# Patient Record
Sex: MALE | URBAN - METROPOLITAN AREA
[De-identification: names, ages, dates, MRNs, and addresses within clinical notes are randomized per-mention and may not be internally consistent; named-entity substitution may affect disease eponyms.]

---

## 2019-10-23 PROCEDURE — 82360 CALCULUS ASSAY QUANT: CPT

## 2019-10-24 ENCOUNTER — LAB REQUISITION (OUTPATIENT)
Dept: LAB | Facility: HOSPITAL | Age: 64
End: 2019-10-24

## 2019-10-24 DIAGNOSIS — N20.0 CALCULUS OF KIDNEY: ICD-10-CM

## 2019-10-31 LAB
COLOR STONE: NORMAL
COMPN STONE: NORMAL
CONV COMMENT: NORMAL
Lab: NORMAL
Lab: NORMAL
NIDUS STONE QL: NORMAL
PATH REPORT.COMMENTS IMP SPEC: NORMAL
SIZE STONE: NORMAL MM
URATE MFR STONE: 100 %
WT STONE: 68.4 MG

## 2023-04-24 ENCOUNTER — OFFICE VISIT (OUTPATIENT)
Dept: FAMILY MEDICINE CLINIC | Facility: CLINIC | Age: 68
End: 2023-04-24
Payer: COMMERCIAL

## 2023-04-24 ENCOUNTER — PATIENT ROUNDING (BHMG ONLY) (OUTPATIENT)
Dept: FAMILY MEDICINE CLINIC | Facility: CLINIC | Age: 68
End: 2023-04-24

## 2023-04-24 VITALS
DIASTOLIC BLOOD PRESSURE: 76 MMHG | TEMPERATURE: 98 F | WEIGHT: 282.2 LBS | HEIGHT: 74 IN | HEART RATE: 75 BPM | BODY MASS INDEX: 36.22 KG/M2 | RESPIRATION RATE: 16 BRPM | OXYGEN SATURATION: 98 % | SYSTOLIC BLOOD PRESSURE: 116 MMHG

## 2023-04-24 DIAGNOSIS — E11.65 UNCONTROLLED TYPE 2 DIABETES MELLITUS WITH HYPERGLYCEMIA: ICD-10-CM

## 2023-04-24 DIAGNOSIS — Z87.891 SMOKING HX: ICD-10-CM

## 2023-04-24 DIAGNOSIS — E55.9 VITAMIN D DEFICIENCY: ICD-10-CM

## 2023-04-24 DIAGNOSIS — I10 PRIMARY HYPERTENSION: Primary | ICD-10-CM

## 2023-04-24 DIAGNOSIS — M1A.9XX0 CHRONIC GOUT WITHOUT TOPHUS, UNSPECIFIED CAUSE, UNSPECIFIED SITE: ICD-10-CM

## 2023-04-24 DIAGNOSIS — Z11.59 NEED FOR HEPATITIS C SCREENING TEST: ICD-10-CM

## 2023-04-24 DIAGNOSIS — E78.2 MIXED HYPERLIPIDEMIA: ICD-10-CM

## 2023-04-24 DIAGNOSIS — Z12.2 SCREENING FOR LUNG CANCER: ICD-10-CM

## 2023-04-24 PROBLEM — M10.9 GOUT: Status: ACTIVE | Noted: 2023-04-24

## 2023-04-24 PROBLEM — M17.0 OSTEOARTHRITIS OF BOTH KNEES: Status: ACTIVE | Noted: 2022-02-10

## 2023-04-24 PROBLEM — H25.9 AGE-RELATED CATARACT OF BOTH EYES: Status: ACTIVE | Noted: 2017-12-27

## 2023-04-24 PROBLEM — I35.0 AORTIC STENOSIS, MILD: Status: ACTIVE | Noted: 2021-03-08

## 2023-04-24 PROBLEM — N18.30 STAGE 3 CHRONIC KIDNEY DISEASE: Status: ACTIVE | Noted: 2021-11-18

## 2023-04-24 PROBLEM — G47.33 OBSTRUCTIVE SLEEP APNEA: Status: ACTIVE | Noted: 2023-04-24

## 2023-04-24 PROBLEM — M24.021 LOOSE BODY IN ELBOW JOINT, RIGHT: Status: ACTIVE | Noted: 2020-07-06

## 2023-04-24 PROBLEM — R09.89 LEFT CAROTID BRUIT: Status: ACTIVE | Noted: 2021-03-08

## 2023-04-24 PROBLEM — M19.90 ARTHRITIS: Status: ACTIVE | Noted: 2020-07-08

## 2023-04-24 PROBLEM — E11.22 TYPE 2 DIABETES MELLITUS WITH DIABETIC CHRONIC KIDNEY DISEASE: Status: ACTIVE | Noted: 2021-11-18

## 2023-04-24 PROBLEM — E87.5 HYPERKALEMIA: Status: ACTIVE | Noted: 2021-11-18

## 2023-04-24 PROCEDURE — 99203 OFFICE O/P NEW LOW 30 MIN: CPT | Performed by: NURSE PRACTITIONER

## 2023-04-24 RX ORDER — LANOLIN ALCOHOL/MO/W.PET/CERES
1 CREAM (GRAM) TOPICAL
COMMUNITY

## 2023-04-24 RX ORDER — DAPAGLIFLOZIN 10 MG/1
1 TABLET, FILM COATED ORAL
COMMUNITY
End: 2023-04-24 | Stop reason: SDUPTHER

## 2023-04-24 RX ORDER — AMPICILLIN TRIHYDRATE 250 MG
2 CAPSULE ORAL
COMMUNITY

## 2023-04-24 RX ORDER — EPLERENONE 50 MG/1
1 TABLET, FILM COATED ORAL DAILY
COMMUNITY
Start: 2023-02-13

## 2023-04-24 RX ORDER — ROSUVASTATIN CALCIUM 20 MG/1
1 TABLET, COATED ORAL DAILY
COMMUNITY
Start: 2023-03-14

## 2023-04-24 RX ORDER — BLOOD SUGAR DIAGNOSTIC
STRIP MISCELLANEOUS
COMMUNITY
Start: 2023-02-21

## 2023-04-24 RX ORDER — HYDROCODONE BITARTRATE AND ACETAMINOPHEN 7.5; 325 MG/1; MG/1
1 TABLET ORAL
COMMUNITY

## 2023-04-24 RX ORDER — TERAZOSIN 5 MG/1
5 CAPSULE ORAL NIGHTLY
COMMUNITY
Start: 2023-03-17

## 2023-04-24 RX ORDER — OLMESARTAN MEDOXOMIL 40 MG/1
1 TABLET ORAL DAILY
COMMUNITY
Start: 2023-03-17

## 2023-04-24 RX ORDER — LANCETS 30 GAUGE
EACH MISCELLANEOUS
COMMUNITY
Start: 2022-12-31

## 2023-04-24 RX ORDER — METFORMIN HYDROCHLORIDE 500 MG/1
2 TABLET, EXTENDED RELEASE ORAL 2 TIMES DAILY
COMMUNITY
Start: 2023-03-14 | End: 2023-04-24 | Stop reason: SDUPTHER

## 2023-04-24 RX ORDER — ASPIRIN 81 MG/1
81 TABLET ORAL
COMMUNITY

## 2023-04-24 RX ORDER — PREDNISONE 20 MG/1
TABLET ORAL
COMMUNITY
Start: 2023-03-13 | End: 2023-04-24

## 2023-04-24 RX ORDER — METHYLPREDNISOLONE 4 MG/1
TABLET ORAL
COMMUNITY
Start: 2023-04-19

## 2023-04-24 RX ORDER — ICOSAPENT ETHYL 1000 MG/1
CAPSULE ORAL
COMMUNITY
Start: 2023-02-13

## 2023-04-24 RX ORDER — MELATONIN
1
COMMUNITY

## 2023-04-24 RX ORDER — ALLOPURINOL 100 MG/1
1 TABLET ORAL
COMMUNITY
Start: 2023-03-14

## 2023-04-24 RX ORDER — DIPHENOXYLATE HYDROCHLORIDE AND ATROPINE SULFATE 2.5; .025 MG/1; MG/1
1 TABLET ORAL DAILY
COMMUNITY

## 2023-04-24 RX ORDER — CLONIDINE HYDROCHLORIDE 0.1 MG/1
0.2 TABLET ORAL 3 TIMES DAILY
COMMUNITY
Start: 2023-02-15

## 2023-04-24 RX ORDER — METOPROLOL SUCCINATE 100 MG/1
TABLET, EXTENDED RELEASE ORAL
COMMUNITY
Start: 2023-04-03

## 2023-04-24 RX ORDER — UBIDECARENONE 200 MG
1 CAPSULE ORAL DAILY
COMMUNITY

## 2023-04-24 NOTE — PROGRESS NOTES
April 24, 2023      Tyson Trimble,     I want to officially welcome you to our practice and ask about your recent visit.     Overall were you satisfied with your visit?   YES    Would you recommend our office to friends and family?   YES    Your experience is very important to us.  You may receive an email regarding a survey.  Please take a moment to complete.  This will help us to improve.      I appreciate you taking the time to answer these questions.       Thank you and have a great day.

## 2023-04-24 NOTE — PROGRESS NOTES
Subjective     Amrik Trimble is a 67 y.o.. male.     Patient here today to become established.    Patient with problem list that includes hypertension, hyperlipidemia, diabetes, CKD, OA, and sleep apnea. Patient sees cardiologist Dr. Lu Hernandez at [facility]; Endocrinologist Dr. Kenya Eric at [facility]; nephrologist Dr. Owen/Venessa at [facility]; Dr. Sukhwinder العلي at  [facility] [place]; ENT and Allergist which he does not remember names. Patient stating he has been having issues with allergies and was put on a 20 day steroid from ENT about 2 months ago and now is on a steroid pack by his Opthamologist.     Patient stating he eats somewhat healthy, drinks water through out day, does drink Ice tea occasionally and soft drinks occasionally. Patient stating he uses a CPAP at night.     Patient having labs done on 4/11/23 with results of glucose 249, HgA1c 9.3, Bun/Creat 23/1.06, Na 142, K+ 5.0, calcium 10.1, AST/ALT 26/33, uric acid 5.6, total cholesterol 153, triglycerides 336, HDL 52, LDL 50, VLDL 51 and UA was normal except with glucose noted.       The following portions of the patient's history were reviewed and updated as appropriate: allergies, current medications, past family history, past medical history, past social history, past surgical history and problem list.    Past Medical History:   Diagnosis Date   • Age-related cataract of both eyes 12/27/2017   • Aortic stenosis, mild 3/8/2021   • Arthritis 7/8/2020    Formatting of this note might be different from the original. Added automatically from request for surgery [id number]   • CAD (coronary artery disease) 5/10/2013   • Gout 4/24/2023   • HTN (hypertension) 5/10/2013   • Hyperkalemia 11/18/2021   • Hyperlipidemia 5/10/2013   • Left carotid bruit 3/8/2021   • Non-ST elevation MI (NSTEMI) 2/1/2012   • Obesity 7/30/2014   • Obstructive sleep apnea 4/24/2023    Formatting of this note might be different from the  "original. compliant   • Osteoarthritis of both knees 2/10/2022   • Proteinuria 2/15/2016   • S/P PTCA (percutaneous transluminal coronary angioplasty) 7/30/2014   • Smoking hx 5/10/2013   • Stage 3 chronic kidney disease 11/18/2021   • Tear of medial meniscus of knee 12/22/2015    Formatting of this note might be different from the original. Added automatically from request for surgery [id number]   • Type 2 diabetes mellitus with diabetic chronic kidney disease 11/18/2021   • Uncontrolled type 2 diabetes mellitus with hyperglycemia 1/2/2015   • Vitamin D deficiency 3/17/2014       Past Surgical History:   Procedure Laterality Date   • CORONARY ANGIOPLASTY      with stents   • ELBOW ARTHROPLASTY Right    • HAND SURGERY Left    • INGUINAL HERNIA REPAIR Bilateral    • KIDNEY STONE SURGERY     • MENISCECTOMY Bilateral    • ROTATOR CUFF REPAIR Left    • ROTATOR CUFF REPAIR Right     x 2   • THUMB ARTHROSCOPY Left     re-attachement       Review of Systems   Constitutional: Negative.    HENT: Negative.    Respiratory: Negative.    Cardiovascular: Negative.    Gastrointestinal: Negative.    Genitourinary: Negative.    Neurological: Negative.    Psychiatric/Behavioral: Negative.        No Known Allergies    Objective     Vitals:    04/24/23 0930   BP: 116/76   Pulse: 75   Resp: 16   Temp: 98 °F (36.7 °C)   SpO2: 98%   Weight: 128 kg (282 lb 3.2 oz)   Height: 188 cm (74\")     Body mass index is 36.23 kg/m².    Physical Exam  Vitals reviewed.   HENT:      Head: Normocephalic.   Eyes:      Pupils: Pupils are equal, round, and reactive to light.   Neck:      Vascular: No carotid bruit.   Cardiovascular:      Rate and Rhythm: Normal rate and regular rhythm.      Pulses: Normal pulses.      Heart sounds: Murmur heard.   Pulmonary:      Effort: Pulmonary effort is normal.      Breath sounds: Normal breath sounds.   Musculoskeletal:         General: Normal range of motion.   Skin:     General: Skin is warm and dry.   Neurological:      " Mental Status: He is alert and oriented to person, place, and time.   Psychiatric:         Behavior: Behavior normal.           Current Outpatient Medications:   •  Accu-Chek Guide test strip, , Disp: , Rfl:   •  allopurinol (ZYLOPRIM) 100 MG tablet, Take 1 tablet by mouth., Disp: , Rfl:   •  aspirin 81 MG EC tablet, Take 1 tablet by mouth., Disp: , Rfl:   •  cholecalciferol (VITAMIN D3) 25 MCG (1000 UT) tablet, Take 1 tablet by mouth., Disp: , Rfl:   •  Cinnamon 500 MG capsule, Take 2 capsules by mouth., Disp: , Rfl:   •  cloNIDine (CATAPRES) 0.1 MG tablet, Take 2 tablets by mouth 3 (Three) Times a Day., Disp: , Rfl:   •  Coenzyme Q10 200 MG capsule, Take 1 tablet by mouth Daily., Disp: , Rfl:   •  dapagliflozin Propanediol 10 MG tablet, Take 10 mg by mouth Daily., Disp: 90 tablet, Rfl: 1  •  Easy Touch Lancets 30G/Twist misc, , Disp: , Rfl:   •  eplerenone (INSPRA) 50 MG tablet, Take 1 tablet by mouth Daily., Disp: , Rfl:   •  glimepiride (AMARYL) 4 MG tablet, Take 1 tablet by mouth 2 (Two) Times a Day., Disp: 180 tablet, Rfl: 1  •  icosapent ethyl (VASCEPA) 1 g capsule capsule, TAKE 2 CAPSULES BY MOUTH TWO TIMES A DAY WITH MEALS, Disp: , Rfl:   •  metFORMIN ER (GLUCOPHAGE-XR) 500 MG 24 hr tablet, Take 2 tablets by mouth 2 (Two) Times a Day., Disp: 360 tablet, Rfl: 1  •  methylPREDNISolone (Medrol) 4 MG dose pack, Take  by mouth., Disp: , Rfl:   •  metoprolol succinate XL (TOPROL-XL) 100 MG 24 hr tablet, , Disp: , Rfl:   •  multivitamin (THERAGRAN) tablet tablet, Take 1 tablet by mouth Daily., Disp: , Rfl:   •  olmesartan (BENICAR) 40 MG tablet, Take 1 tablet by mouth Daily., Disp: , Rfl:   •  rosuvastatin (CRESTOR) 20 MG tablet, Take 1 tablet by mouth Daily., Disp: , Rfl:   •  terazosin (HYTRIN) 5 MG capsule, Take 1 capsule by mouth Every Night., Disp: , Rfl:   •  Cholecalciferol 50 MCG (2000 UT) capsule, Take 1 capsule by mouth Daily., Disp: , Rfl:   •  HYDROcodone-acetaminophen (NORCO) 7.5-325 MG per tablet,  Take 1 tablet by mouth., Disp: , Rfl:   •  thiamine (VITAMIN B1) 100 MG tablet, Take 1 tablet by mouth., Disp: , Rfl:       Diagnoses and all orders for this visit:    1. Primary hypertension (Primary)  -     CBC & Differential    2. Mixed hyperlipidemia    3. Uncontrolled type 2 diabetes mellitus with hyperglycemia    4. Vitamin D deficiency    5. Chronic gout without tophus, unspecified cause, unspecified site    6. Smoking hx  -     CT Chest Low Dose Wo; Future    7. Screening for lung cancer  -     CT Chest Low Dose Wo; Future    8. Need for hepatitis C screening test  -     Hepatitis C antibody        Patient Instructions   Hypertension: stable, continue clonidine 0.1 mg two tabs three times a day, inspra 50 mg 1 tab daily, metoprolol  mg 1 tab daily, olmesartan 40 mg 1 tab daily    Hyperlipidemia: stable, triglycerides elevated; recommend working on eating less sugary foods, white bread, white rice, white potatoes, starchy foods; continue Vascepa 1 g two caps twice a day, rosuvastatin 20 mg 1 tab daily    DM2: uncontrolled; discussed Patient being on back to back steroids and elevation of his HgA1c, recommend he call and follow up with his endocrinologist; continue dapagliflozin 10 mg 1 tab daily, amaryl 4 mg 1 tab twice a day, metformin  mg 2 tabs twice a day    Vitamin D deficiency: stable as of 11/28/22 labs with result of 35.8, continue vitamin D supplement daily    Chronic gout: stable, continue allopurinol 100 mg 1 tab daily          Return for Medicare Wellness.

## 2023-04-24 NOTE — PATIENT INSTRUCTIONS
Hypertension: stable, continue clonidine 0.1 mg two tabs three times a day, inspra 50 mg 1 tab daily, metoprolol  mg 1 tab daily, olmesartan 40 mg 1 tab daily    Hyperlipidemia: stable, triglycerides elevated; recommend working on eating less sugary foods, white bread, white rice, white potatoes, starchy foods; continue Vascepa 1 g two caps twice a day, rosuvastatin 20 mg 1 tab daily    DM2: uncontrolled; discussed Patient being on back to back steroids and elevation of his HgA1c, recommend he call and follow up with his endocrinologist; continue dapagliflozin 10 mg 1 tab daily, amaryl 4 mg 1 tab twice a day, metformin  mg 2 tabs twice a day    Vitamin D deficiency: stable as of 11/28/22 labs with result of 35.8, continue vitamin D supplement daily    Chronic gout: stable, continue allopurinol 100 mg 1 tab daily

## 2023-04-25 ENCOUNTER — TELEPHONE (OUTPATIENT)
Dept: FAMILY MEDICINE CLINIC | Facility: CLINIC | Age: 68
End: 2023-04-25
Payer: COMMERCIAL

## 2023-04-25 LAB
BASOPHILS # BLD AUTO: 0.05 10*3/MM3 (ref 0–0.2)
BASOPHILS NFR BLD AUTO: 0.6 % (ref 0–1.5)
EOSINOPHIL # BLD AUTO: 0.03 10*3/MM3 (ref 0–0.4)
EOSINOPHIL NFR BLD AUTO: 0.3 % (ref 0.3–6.2)
ERYTHROCYTE [DISTWIDTH] IN BLOOD BY AUTOMATED COUNT: 12 % (ref 12.3–15.4)
HCT VFR BLD AUTO: 45.5 % (ref 37.5–51)
HCV IGG SERPL QL IA: NON REACTIVE
HGB BLD-MCNC: 15.5 G/DL (ref 13–17.7)
IMM GRANULOCYTES # BLD AUTO: 0.05 10*3/MM3 (ref 0–0.05)
IMM GRANULOCYTES NFR BLD AUTO: 0.6 % (ref 0–0.5)
LYMPHOCYTES # BLD AUTO: 2.34 10*3/MM3 (ref 0.7–3.1)
LYMPHOCYTES NFR BLD AUTO: 26.4 % (ref 19.6–45.3)
MCH RBC QN AUTO: 30.6 PG (ref 26.6–33)
MCHC RBC AUTO-ENTMCNC: 34.1 G/DL (ref 31.5–35.7)
MCV RBC AUTO: 89.9 FL (ref 79–97)
MONOCYTES # BLD AUTO: 0.7 10*3/MM3 (ref 0.1–0.9)
MONOCYTES NFR BLD AUTO: 7.9 % (ref 5–12)
NEUTROPHILS # BLD AUTO: 5.7 10*3/MM3 (ref 1.7–7)
NEUTROPHILS NFR BLD AUTO: 64.2 % (ref 42.7–76)
NRBC BLD AUTO-RTO: 0 /100 WBC (ref 0–0.2)
PLATELET # BLD AUTO: 233 10*3/MM3 (ref 140–450)
RBC # BLD AUTO: 5.06 10*6/MM3 (ref 4.14–5.8)
WBC # BLD AUTO: 8.87 10*3/MM3 (ref 3.4–10.8)

## 2023-04-25 NOTE — TELEPHONE ENCOUNTER
----- Message from KAI Brown sent at 4/25/2023  2:37 PM EDT -----  Please call Patient and inform him that his cbc  WBC was normal, no systemic infection  Hemoglobin and Hematocrit were normal, no anemia issues  Plantlet count was normal (this is what clots your blood), no issues  Hep C negative  Thanks

## 2023-05-25 ENCOUNTER — OFFICE VISIT (OUTPATIENT)
Dept: FAMILY MEDICINE CLINIC | Facility: CLINIC | Age: 68
End: 2023-05-25
Payer: COMMERCIAL

## 2023-05-25 VITALS
WEIGHT: 284 LBS | DIASTOLIC BLOOD PRESSURE: 58 MMHG | OXYGEN SATURATION: 95 % | RESPIRATION RATE: 16 BRPM | BODY MASS INDEX: 36.45 KG/M2 | HEART RATE: 65 BPM | TEMPERATURE: 97.8 F | SYSTOLIC BLOOD PRESSURE: 108 MMHG | HEIGHT: 74 IN

## 2023-05-25 DIAGNOSIS — I25.10 CORONARY ARTERY DISEASE, UNSPECIFIED VESSEL OR LESION TYPE, UNSPECIFIED WHETHER ANGINA PRESENT, UNSPECIFIED WHETHER NATIVE OR TRANSPLANTED HEART: ICD-10-CM

## 2023-05-25 DIAGNOSIS — I10 PRIMARY HYPERTENSION: ICD-10-CM

## 2023-05-25 DIAGNOSIS — Z00.00 MEDICARE ANNUAL WELLNESS VISIT, INITIAL: Primary | ICD-10-CM

## 2023-05-25 DIAGNOSIS — L98.9 SKIN ABNORMALITIES: ICD-10-CM

## 2023-05-25 DIAGNOSIS — I35.0 AORTIC STENOSIS, MILD: ICD-10-CM

## 2023-05-25 DIAGNOSIS — Z00.00 INITIAL MEDICARE ANNUAL WELLNESS VISIT: Primary | ICD-10-CM

## 2023-05-25 DIAGNOSIS — M25.551 PAIN OF RIGHT HIP: ICD-10-CM

## 2023-05-25 LAB
BILIRUB BLD-MCNC: NEGATIVE MG/DL
CLARITY, POC: ABNORMAL
COLOR UR: YELLOW
GLUCOSE UR STRIP-MCNC: ABNORMAL MG/DL
KETONES UR QL: NEGATIVE
LEUKOCYTE EST, POC: NEGATIVE
NITRITE UR-MCNC: NEGATIVE MG/ML
PH UR: 5 [PH] (ref 5–8)
PROT UR STRIP-MCNC: NEGATIVE MG/DL
RBC # UR STRIP: NEGATIVE /UL
SP GR UR: 1.02 (ref 1–1.03)
UROBILINOGEN UR QL: NORMAL

## 2023-05-25 NOTE — PROGRESS NOTES
The ABCs of the Annual Wellness Visit  Initial Medicare Wellness Visit    Subjective     Amrik Trimble is a 67 y.o. male who presents for an Initial Medicare Wellness Visit.    The following portions of the patient's history were reviewed and   updated as appropriate: allergies, current medications, past family history, past medical history, past social history, past surgical history and problem list.     Compared to one year ago, the patient feels his physical   health is worse. Right hip pain    Compared to one year ago, the patient feels his mental   health is the same.    Recent Hospitalizations:  He was not admitted to the hospital during the last year.       Current Medical Providers:  Patient Care Team:  Rupinder Link APRN as PCP - General (Family Medicine)    Outpatient Medications Prior to Visit   Medication Sig Dispense Refill   • Accu-Chek Guide test strip      • ACETAMINOPHEN PO Take 1 tablet by mouth.     • allopurinol (ZYLOPRIM) 100 MG tablet Take 1 tablet by mouth.     • aspirin 81 MG EC tablet Take 1 tablet by mouth.     • cholecalciferol (VITAMIN D3) 25 MCG (1000 UT) tablet Take 1 tablet by mouth.     • Cholecalciferol 50 MCG (2000 UT) capsule Take 1 capsule by mouth Daily.     • Cinnamon 500 MG capsule Take 2 capsules by mouth.     • cloNIDine (CATAPRES) 0.1 MG tablet Take 2 tablets by mouth 3 (Three) Times a Day.     • Coenzyme Q10 200 MG capsule Take 1 tablet by mouth Daily.     • dapagliflozin Propanediol 10 MG tablet Take 10 mg by mouth Daily. 90 tablet 1   • Easy Touch Lancets 30G/Twist misc      • eplerenone (INSPRA) 50 MG tablet Take 1 tablet by mouth Daily.     • fluconazole (DIFLUCAN) 200 MG tablet Take 2 tablets by mouth on day 1 and then take 1 tablet daily for two weeks 15 tablet 0   • glimepiride (AMARYL) 4 MG tablet Take 1 tablet by mouth 2 (Two) Times a Day. 180 tablet 1   • HYDROcodone-acetaminophen (NORCO) 7.5-325 MG per tablet Take 1 tablet by mouth.     • hydrocortisone 2.5 %  ointment Apply a thin layer to affected areas by topical route twice daily. Safe to use on face. 454 g 11   • icosapent ethyl (VASCEPA) 1 g capsule capsule TAKE 2 CAPSULES BY MOUTH TWO TIMES A DAY WITH MEALS     • ketotifen ([employer] Allergy Eye Drops) 0.025 % ophthalmic solution Administer 1 drop to both eyes 2 (Two) Times a Day As Needed for itch 10 mL 12   • metFORMIN ER (GLUCOPHAGE-XR) 500 MG 24 hr tablet Take 2 tablets by mouth 2 (Two) Times a Day. 360 tablet 1   • methylPREDNISolone (MEDROL) 4 MG dose pack Take  by mouth.     • metoprolol succinate XL (TOPROL-XL) 100 MG 24 hr tablet      • multivitamin (THERAGRAN) tablet tablet Take 1 tablet by mouth Daily.     • olmesartan (BENICAR) 40 MG tablet Take 1 tablet by mouth Daily.     • rosuvastatin (CRESTOR) 20 MG tablet Take 1 tablet by mouth Daily.     • terazosin (HYTRIN) 5 MG capsule Take 1 capsule by mouth Every Night.     • thiamine (VITAMIN B1) 100 MG tablet Take 1 tablet by mouth.       No facility-administered medications prior to visit.       Opioid medication/s are on active medication list.  and I have evaluated his active treatment plan and pain score trends (see table).  There were no vitals filed for this visit.  I have reviewed the chart for potential of high risk medication and harmful drug interactions in the elderly.            Aspirin is on active medication list. Aspirin use is indicated based on review of current medical condition/s. Pros and cons of this therapy have been discussed today. Benefits of this medication outweigh potential harm.  Patient has been encouraged to continue taking this medication.  .      Patient Active Problem List   Diagnosis   • Age-related cataract of both eyes   • Aortic stenosis, mild   • Arthritis   • CAD (coronary artery disease)   • HTN (hypertension)   • Hyperkalemia   • Hyperlipidemia   • Knee pain   • Left carotid bruit   • Loose body in elbow joint, right   • Non-ST elevation MI (NSTEMI)   • Obesity   •  "Obstructive sleep apnea   • Osteoarthritis of both knees   • Proteinuria   • S/P PTCA (percutaneous transluminal coronary angioplasty)   • Smoking hx   • Stage 3 chronic kidney disease   • Tear of medial meniscus of knee   • Type 2 diabetes mellitus with diabetic chronic kidney disease   • Uncontrolled type 2 diabetes mellitus with hyperglycemia   • Vitamin D deficiency   • Gout     Advance Care Planning   Advance Care Planning     Advance Directive is not on file.  ACP discussion was held with the patient during this visit. Patient does not have an advance directive, declines further assistance.       Objective    Vitals:    05/25/23 0808   BP: 108/58   Pulse: 65   Resp: 16   Temp: 97.8 °F (36.6 °C)   SpO2: 95%   Weight: 129 kg (284 lb)   Height: 188 cm (74.02\")     Estimated body mass index is 36.45 kg/m² as calculated from the following:    Height as of this encounter: 188 cm (74.02\").    Weight as of this encounter: 129 kg (284 lb).    Class 2 Severe Obesity (BMI >=35 and <=39.9). Obesity-related health conditions include the following: obstructive sleep apnea, hypertension, coronary heart disease, diabetes mellitus, dyslipidemias and osteoarthritis. Obesity is unchanged. BMI is is above average; BMI management plan is completed. We discussed portion control and increasing exercise.      Does the patient have evidence of cognitive impairment?   No          HEALTH RISK ASSESSMENT    Smoking Status:  Social History     Tobacco Use   Smoking Status Former   • Types: Cigarettes   Smokeless Tobacco Never   Tobacco Comments    Quit in 2012; 1+PPD     Alcohol Consumption:  Social History     Substance and Sexual Activity   Alcohol Use Yes    Comment: rarely     Fall Risk Screen:    OMEGA Fall Risk Assessment was completed, and patient is at LOW risk for falls.Assessment completed on:5/25/2023    Depression Screen:       5/25/2023     8:05 AM   PHQ-2/PHQ-9 Depression Screening   Little Interest or Pleasure in Doing " Things 0-->not at all   Feeling Down, Depressed or Hopeless 0-->not at all   PHQ-9: Brief Depression Severity Measure Score 0       Health Habits and Functional and Cognitive Screenin/25/2023     8:06 AM   Functional & Cognitive Status   Do you have difficulty preparing food and eating? No   Do you have difficulty bathing yourself, getting dressed or grooming yourself? No   Do you have difficulty using the toilet? No   Do you have difficulty moving around from place to place? No   Do you have trouble with steps or getting out of a bed or a chair? No   Current Diet Well Balanced Diet   Dental Exam Up to date   Eye Exam Up to date   Exercise (times per week) 1 times per week   Current Exercises Include Walking;Yard Work   Do you need help using the phone?  No   Are you deaf or do you have serious difficulty hearing?  No   Do you need help with transportation? No   Do you need help shopping? No   Do you need help preparing meals?  No   Do you need help with housework?  No   Do you need help with laundry? No   Do you need help taking your medications? No   Do you need help managing money? No   Do you ever drive or ride in a car without wearing a seat belt? No   Have you felt unusual stress, anger or loneliness in the last month? No   Who do you live with? Spouse   If you need help, do you have trouble finding someone available to you? No   Have you been bothered in the last four weeks by sexual problems? No   Do you have difficulty concentrating, remembering or making decisions? No       Age-appropriate Screening Schedule:  Refer to the list below for future screening recommendations based on patient's age, sex and/or medical conditions. Orders for these recommended tests are listed in the plan section. The patient has been provided with a written plan.    Health Maintenance   Topic Date Due   • ZOSTER VACCINE (2 of 3) 11/15/2017   • COVID-19 Vaccine (4 - Booster for Moderna series) 2022   • AAA SCREEN  (ONE-TIME)  Never done   • INFLUENZA VACCINE  08/01/2023   • HEMOGLOBIN A1C  10/11/2023   • URINE MICROALBUMIN  11/28/2023   • DIABETIC FOOT EXAM  02/10/2024   • LIPID PANEL  04/11/2024   • DIABETIC EYE EXAM  04/19/2024   • ANNUAL WELLNESS VISIT  05/25/2024   • COLORECTAL CANCER SCREENING  04/09/2025   • Pneumococcal Vaccine 65+ (3 - PPSV23 if available, else PCV20) 01/28/2027   • TDAP/TD VACCINES (2 - Td or Tdap) 09/20/2029   • HEPATITIS C SCREENING  Completed          Recent Results (from the past 1008 hour(s))   CBC & Differential    Collection Time: 04/24/23 10:31 AM    Specimen: Blood   Result Value Ref Range    WBC 8.87 3.40 - 10.80 10*3/mm3    RBC 5.06 4.14 - 5.80 10*6/mm3    Hemoglobin 15.5 13.0 - 17.7 g/dL    Hematocrit 45.5 37.5 - 51.0 %    MCV 89.9 79.0 - 97.0 fL    MCH 30.6 26.6 - 33.0 pg    MCHC 34.1 31.5 - 35.7 g/dL    RDW 12.0 (L) 12.3 - 15.4 %    Platelets 233 140 - 450 10*3/mm3    Neutrophil Rel % 64.2 42.7 - 76.0 %    Lymphocyte Rel % 26.4 19.6 - 45.3 %    Monocyte Rel % 7.9 5.0 - 12.0 %    Eosinophil Rel % 0.3 0.3 - 6.2 %    Basophil Rel % 0.6 0.0 - 1.5 %    Neutrophils Absolute 5.70 1.70 - 7.00 10*3/mm3    Lymphocytes Absolute 2.34 0.70 - 3.10 10*3/mm3    Monocytes Absolute 0.70 0.10 - 0.90 10*3/mm3    Eosinophils Absolute 0.03 0.00 - 0.40 10*3/mm3    Basophils Absolute 0.05 0.00 - 0.20 10*3/mm3    Immature Granulocyte Rel % 0.6 (H) 0.0 - 0.5 %    Immature Grans Absolute 0.05 0.00 - 0.05 10*3/mm3    nRBC 0.0 0.0 - 0.2 /100 WBC   Hepatitis C antibody    Collection Time: 04/24/23 10:31 AM    Specimen: Blood   Result Value Ref Range    Hep C Virus Ab Non Reactive Non Reactive   POCT urinalysis dipstick, manual    Collection Time: 05/25/23  8:41 AM    Specimen: Urine   Result Value Ref Range    Color Yellow Yellow, Straw, Dark Yellow, Heena    Clarity, UA Cloudy (A) Clear    Glucose, UA 1000 mg/dL (A) Negative mg/dL    Bilirubin Negative Negative    Ketones, UA Negative Negative    Specific Gravity   1.025 1.005 - 1.030    Blood, UA Negative Negative    pH, Urine 5.0 5.0 - 8.0    Protein, POC Negative Negative mg/dL    Urobilinogen, UA Normal Normal, 0.2 E.U./dL    Leukocytes Negative Negative    Nitrite, UA Negative Negative       Physical Exam  Vitals reviewed.   Constitutional:       Appearance: Normal appearance. He is well-developed.   HENT:      Head: Normocephalic and atraumatic.      Right Ear: Tympanic membrane normal. Tympanic membrane is not erythematous.      Left Ear: Tympanic membrane normal. Tympanic membrane is not erythematous.      Nose: Nose normal.   Eyes:      Conjunctiva/sclera: Conjunctivae normal.      Pupils: Pupils are equal, round, and reactive to light.   Neck:      Thyroid: No thyromegaly.      Vascular: No carotid bruit.      Trachea: No tracheal deviation.   Cardiovascular:      Rate and Rhythm: Normal rate and regular rhythm.      Pulses: Normal pulses.      Heart sounds: Normal heart sounds. No murmur heard.  Pulmonary:      Effort: Pulmonary effort is normal. No accessory muscle usage or respiratory distress.      Breath sounds: Normal breath sounds. No stridor. No decreased breath sounds, wheezing, rhonchi or rales.   Abdominal:      General: Bowel sounds are normal. There is no distension.      Palpations: Abdomen is soft. Abdomen is not rigid. There is no mass.      Tenderness: There is no abdominal tenderness. There is no guarding or rebound.      Hernia: No hernia is present.   Musculoskeletal:         General: Normal range of motion.      Cervical back: Normal range of motion and neck supple.   Lymphadenopathy:      Cervical: No cervical adenopathy.   Skin:     General: Skin is warm and dry.   Neurological:      Mental Status: He is alert and oriented to person, place, and time.      Cranial Nerves: No cranial nerve deficit.      Sensory: No sensory deficit.      Motor: No abnormal muscle tone.      Coordination: Coordination normal.   Psychiatric:         Speech: Speech  normal.         Behavior: Behavior normal.       EKG in office today: SR, vent rate 78, ID int [id number],     CMS Preventative Services Quick Reference  Risk Factors Identified During Encounter    Chronic Pain: Orthopedics Referral Ordered  Immunizations Discussed/Encouraged: Shingrix and COVID19  Inactivity/Sedentary: Patient was advised to exercise at least 150 minutes a week per CDC recommendations.  Dental Screening Recommended  Vision Screening Recommended    The above risks/problems have been discussed with the patient.  Pertinent information has been shared with the patient in the After Visit Summary.  An After Visit Summary and PPPS were made available to the patient.  Diagnoses and all orders for this visit:    1. Medicare annual wellness visit, initial (Primary)  -     POCT urinalysis dipstick, manual    2. Primary hypertension  -     Ambulatory Referral to Cardiology    3. Aortic stenosis, mild  -     Ambulatory Referral to Cardiology    4. Coronary artery disease, unspecified vessel or lesion type, unspecified whether angina present, unspecified whether native or transplanted heart  -     Ambulatory Referral to Cardiology    5. Pain of right hip  -     Ambulatory Referral to Orthopedic Surgery    6. Skin abnormalities  -     Ambulatory Referral to Dermatology      Follow Up:  Next Medicare Wellness visit to be scheduled in 1 year.        cardiologist Dr. Lu Hernandez at [facility]; wanting to switch to [facility], referral placed  Endocrinologist Dr. Kenya Eric at [facility]  nephrologist Dr. Owen/Venessa at [facility]  Dr. Sukhwinder العلي at  [facility]      Low dose CT chest ordered 4/24/23  Cologaurd 4/9/2022 negative  PSA 7/22/22 0.83

## 2023-06-15 ENCOUNTER — HOSPITAL ENCOUNTER (EMERGENCY)
Facility: HOSPITAL | Age: 68
Discharge: HOME | End: 2023-06-15
Attending: EMERGENCY MEDICINE
Payer: COMMERCIAL

## 2023-06-15 VITALS
TEMPERATURE: 97.6 F | HEIGHT: 74 IN | SYSTOLIC BLOOD PRESSURE: 120 MMHG | BODY MASS INDEX: 36.32 KG/M2 | RESPIRATION RATE: 17 BRPM | WEIGHT: 283 LBS | DIASTOLIC BLOOD PRESSURE: 67 MMHG | HEART RATE: 102 BPM | OXYGEN SATURATION: 93 %

## 2023-06-15 DIAGNOSIS — S61.412A LACERATION OF LEFT HAND, FOREIGN BODY PRESENCE UNSPECIFIED, INITIAL ENCOUNTER: Primary | ICD-10-CM

## 2023-06-15 RX ORDER — LIDOCAINE HYDROCHLORIDE AND EPINEPHRINE 10; 10 MG/ML; UG/ML
10 INJECTION, SOLUTION INFILTRATION; PERINEURAL ONCE
Status: COMPLETED | OUTPATIENT
Start: 2023-06-15 | End: 2023-06-15

## 2023-06-15 RX ADMIN — LIDOCAINE HYDROCHLORIDE,EPINEPHRINE BITARTRATE 10 ML: 10; .01 INJECTION, SOLUTION INFILTRATION; PERINEURAL at 18:54

## 2023-06-15 NOTE — ED PROVIDER NOTES
EMERGENCY DEPARTMENT ENCOUNTER    Room Number:  S01/01  Date of encounter:  6/15/2023  PCP: Rupinder Link APRN  Historian: Patient  Full history not obtainable due to: None    HPI:  Chief Complaint: Laceration    Context: Amrik Trimble is a 67 y.o. male who presents to the ED c/o of superficial laceration involving the dorsum of the left hand which he sustained earlier today.  The patient was cleaning on top of a cabinet and cut himself on a piece of aluminum that was hanging down above the cabinet.  He was able to control the bleeding fairly quickly with direct pressure.  Full range of motion of the fingers and hand intact.  Does not take any blood thinners.  No concern for foreign body.  Full sensation intact throughout the hand and fingers on the affected side.      MEDICAL RECORD REVIEW:    Upon review of the medical record it appears the patient was evaluated in the office with family medicine on May 25, 2023 for Medicare annual wellness visit.  He had a normal T3 on 11/28/2022 and an elevated C-peptide on 11/28/2022.    PAST MEDICAL HISTORY    Active Ambulatory Problems     Diagnosis Date Noted   • Age-related cataract of both eyes 12/27/2017   • Aortic stenosis, mild 03/08/2021   • Arthritis 07/08/2020   • CAD (coronary artery disease) 05/10/2013   • HTN (hypertension) 05/10/2013   • Hyperkalemia 11/18/2021   • Hyperlipidemia 05/10/2013   • Knee pain 06/05/2014   • Left carotid bruit 03/08/2021   • Loose body in elbow joint, right 07/06/2020   • Non-ST elevation MI (NSTEMI) 02/01/2012   • Obesity 07/30/2014   • Obstructive sleep apnea 04/24/2023   • Osteoarthritis of both knees 02/10/2022   • Proteinuria 02/15/2016   • S/P PTCA (percutaneous transluminal coronary angioplasty) 07/30/2014   • Smoking hx 05/10/2013   • Stage 3 chronic kidney disease 11/18/2021   • Tear of medial meniscus of knee 12/22/2015   • Type 2 diabetes mellitus with diabetic chronic kidney disease 11/18/2021   • Uncontrolled type 2  diabetes mellitus with hyperglycemia 01/02/2015   • Vitamin D deficiency 03/17/2014   • Gout 04/24/2023     Resolved Ambulatory Problems     Diagnosis Date Noted   • No Resolved Ambulatory Problems     No Additional Past Medical History         PAST SURGICAL HISTORY  Past Surgical History:   Procedure Laterality Date   • CORONARY ANGIOPLASTY      with stents   • ELBOW ARTHROPLASTY Right    • HAND SURGERY Left    • INGUINAL HERNIA REPAIR Bilateral    • KIDNEY STONE SURGERY     • MENISCECTOMY Bilateral    • ROTATOR CUFF REPAIR Left    • ROTATOR CUFF REPAIR Right     x 2   • THUMB ARTHROSCOPY Left     re-attachement         FAMILY HISTORY  Family History   Problem Relation Age of Onset   • Emphysema Mother    • No Known Problems Father    • No Known Problems Sister    • No Known Problems Brother    • Diabetes Maternal Grandmother    • Arthritis Maternal Grandfather    • Kidney disease Maternal Grandfather    • Glaucoma Paternal Grandmother    • No Known Problems Paternal Grandfather          SOCIAL HISTORY  Social History     Socioeconomic History   • Marital status:    Tobacco Use   • Smoking status: Former     Types: Cigarettes   • Smokeless tobacco: Never   • Tobacco comments:     Quit in 2012; 1+PPD   Vaping Use   • Vaping Use: Never used   Substance and Sexual Activity   • Alcohol use: Yes     Comment: rarely   • Drug use: Never   • Sexual activity: Yes     Partners: Female         ALLERGIES  Patient has no known allergies.        REVIEW OF SYSTEMS    All systems reviewed and marked as negative except as listed in HPI     PHYSICAL EXAM    I have reviewed the triage vital signs and nursing notes.    ED Triage Vitals   Temp Heart Rate Resp BP SpO2   06/15/23 1802 06/15/23 1802 06/15/23 1802 06/15/23 1804 06/15/23 1802   97.6 °F (36.4 °C) 102 17 120/67 93 %      Temp src Heart Rate Source Patient Position BP Location FiO2 (%)   -- -- -- -- --              Physical Exam  Constitutional:       General: He is not  in acute distress.     Appearance: He is well-developed.   HENT:      Head: Normocephalic and atraumatic.   Eyes:      General: No scleral icterus.     Conjunctiva/sclera: Conjunctivae normal.   Neck:      Trachea: No tracheal deviation.   Cardiovascular:      Rate and Rhythm: Normal rate and regular rhythm.   Pulmonary:      Effort: Pulmonary effort is normal.      Breath sounds: Normal breath sounds.   Abdominal:      Palpations: Abdomen is soft.      Tenderness: There is no abdominal tenderness. There is no guarding.   Musculoskeletal:         General: No deformity.      Cervical back: Normal range of motion.   Lymphadenopathy:      Cervical: No cervical adenopathy.   Skin:     General: Skin is warm and dry.             Comments: Laceration to the dorsum of the left hand is approximately 2 to 3 cm in length and extends down to the extensor tendon.  No evidence of tendon injury or laceration.  Full range of motion against resistance intact to the extensor tendons.  Radial, ulnar, median nerves intact on assessment at this time.   Neurological:      Mental Status: He is alert and oriented to person, place, and time.   Psychiatric:         Behavior: Behavior normal.       Vital signs and nursing notes reviewed.            LAB RESULTS  No results found for this or any previous visit (from the past 24 hour(s)).    Ordered the above labs and independently reviewed the results.        RADIOLOGY  No Radiology Exams Resulted Within Past 24 Hours    I ordered the above noted radiological studies. Independently reviewed by me and discussed with radiologist.  See dictation above for official radiology interpretation.      PROCEDURES    Laceration Repair    Date/Time: 6/15/2023 7:33 PM  Performed by: Bebo Chahal PA  Authorized by: Mike Aaron MD     Consent:     Consent obtained:  Verbal    Consent given by:  Patient    Risks discussed:  Poor cosmetic result, pain, infection and tendon damage  Universal protocol:      Procedure explained and questions answered to patient or proxy's satisfaction: no    Anesthesia:     Anesthesia method:  Local infiltration    Local anesthetic:  Lidocaine 1% WITH epi  Laceration details:     Location:  Hand    Hand location:  R hand, dorsum    Length (cm):  2    Depth (mm):  1  Exploration:     Hemostasis achieved with:  Epinephrine and direct pressure    Wound exploration: wound explored through full range of motion      Contaminated: no    Treatment:     Area cleansed with:  Chlorhexidine    Amount of cleaning:  Standard    Irrigation solution:  Sterile saline    Irrigation method:  Syringe  Skin repair:     Repair method:  Sutures    Suture size:  4-0    Suture material:  Nylon    Suture technique:  Simple interrupted    Number of sutures:  5  Approximation:     Approximation:  Close  Repair type:     Repair type:  Intermediate  Post-procedure details:     Dressing:  Antibiotic ointment and non-adherent dressing    Procedure completion:  Tolerated well, no immediate complications        MEDICATIONS GIVEN IN ER    Medications   Tetanus-Diphth-Acell Pertussis (BOOSTRIX) injection 0.5 mL (has no administration in time range)   lidocaine 1% - EPINEPHrine 1:[id number] (XYLOCAINE W/EPI) 1 %-1:031402 injection 10 mL (10 mL Injection Given 6/15/23 1854)         PROGRESS, DATA ANALYSIS, CONSULTS, AND MEDICAL DECISION MAKING    All labs have been independently interpreted by me.  All radiology studies have been interpreted by me.  Discussion below represents my analysis of pertinent findings related to patient's condition, differential diagnosis, treatment plan and final disposition.    Patient presentation and evaluation consistent with uncomplicated superficial laceration to the dorsum of the left hand.  No evidence of nerve or tendon injury.  No concern for foreign body or fracture.  Wound repaired with 5 simple interrupted sutures after extensive cleaning.  Patient tolerated well.  Close return  precautions given.  Encouraged to follow-up with PCP for suture removal and wound recheck.    - Chronic or social conditions impacting care: None      DIFFERENTIAL DIAGNOSIS INCLUDE BUT NOT LIMITED TO:     Laceration, skin avulsion, foreign body      Orders placed during this visit:  Orders Placed This Encounter   Procedures   • Laceration Repair              AS OF 19:41 EDT VITALS:    BP - 120/67  HR - 102  TEMP - 97.6 °F (36.4 °C)  02 SATS - 93%    1941 I rechecked the patient.  I discussed the patient's labs, radiology findings (including all incidental findings), diagnosis, and plan for discharge.  A repeat exam reveals no new worrisome changes from my initial exam findings.  The patient understands that the fact that they are being discharged does not denote that nothing is abnormal, it indicates that no clinical emergency is present and that they must follow-up as directed in order to properly maintain their health.  Follow-up instructions (specifically listed below) and return to ER precautions were given at this time.  I specifically instructed the patient to follow-up with their PCP.  The patient understands and agrees with the plan, and is ready for discharge.  All questions answered.      DIAGNOSIS  Final diagnoses:   Laceration of left hand, foreign body presence unspecified, initial encounter         DISPOSITION  D/c    Pt masked in first look. I wore a surgical mask throughout my encounters with the pt. I performed hand hygiene on entry into the pt room and upon exit.     Dictated utilizing Dragon dictation     Note Disclaimer: At [facility], we believe that sharing information builds trust and better relationships. You are receiving this note because you recently visited [facility]. It is possible you will see health information before a provider has talked with you about it. This kind of information can be easy to misunderstand. To help you fully understand what it means for your health, we  urge you to discuss this note with your provider.      Bebo Chahal, PA  06/16/23 8723

## 2023-06-15 NOTE — ED PROVIDER NOTES
MD ATTESTATION NOTE    The JAGRUTI and I have discussed this patient's history, physical exam, and treatment plan.  I have reviewed the documentation and personally had a face to face interaction with the patient. I affirm the documentation and agree with the treatment and plan.  The attached note describes my personal findings.      I provided a substantive portion of the care of the patient.  I personally performed the physical exam in its entirety, and below are my findings.  For this patient encounter, the patient wore surgical mask, I wore full protective PPE including N95 and eye protection.      Brief HPI: 67-year-old gentleman with a laceration of the dorsum of his left hand from working on an aluminum roof    PHYSICAL EXAM  ED Triage Vitals   Temp Heart Rate Resp BP SpO2   06/15/23 1802 06/15/23 1802 06/15/23 1802 06/15/23 1804 06/15/23 1802   97.6 °F (36.4 °C) 102 17 120/67 93 %      Temp src Heart Rate Source Patient Position BP Location FiO2 (%)   -- -- -- -- --                GENERAL: no acute distress  HENT: nares patent  EYES: no scleral icterus  CV: regular rhythm, normal rate  RESPIRATORY: normal effort  ABDOMEN: soft  MUSCULOSKELETAL: no deformity, superficial appearing laceration just proximal to the third MCP joint on the dorsum of the left hand.  No apparent tendinous injury, neurovascular intact distally and fairly clean.  NEURO: alert, moves all extremities, follows commands  PSYCH:  calm, cooperative  SKIN: warm, dry    Vital signs and nursing notes reviewed.    MDM DISCUSSION        Plan: Wound repair performed by the JAGRUTI.  Full testing for range of motion will be performed to look for any possible tendon injury and discharged     Mike Aaron MD  06/15/23 1936

## 2023-06-15 NOTE — ED NOTES
Pt to er via pv with laceration to right hand. Pt states he was cleaning his house and cut his hand aluminum. Pt states last tetanus was 2 years ago.

## 2023-08-04 ENCOUNTER — HOSPITAL ENCOUNTER (OUTPATIENT)
Dept: CT IMAGING | Facility: HOSPITAL | Age: 68
Discharge: HOME | End: 2023-08-04
Admitting: NURSE PRACTITIONER
Payer: COMMERCIAL

## 2023-08-04 DIAGNOSIS — Z87.891 SMOKING HX: ICD-10-CM

## 2023-08-04 DIAGNOSIS — Z12.2 SCREENING FOR LUNG CANCER: ICD-10-CM

## 2023-08-04 PROCEDURE — 71271 CT THORAX LUNG CANCER SCR C-: CPT

## 2023-08-09 ENCOUNTER — TELEPHONE (OUTPATIENT)
Dept: FAMILY MEDICINE CLINIC | Facility: CLINIC | Age: 68
End: 2023-08-09
Payer: COMMERCIAL

## 2023-08-18 ENCOUNTER — PATIENT OUTREACH (OUTPATIENT)
Dept: CASE MANAGEMENT | Facility: OTHER | Age: 68
End: 2023-08-18
Payer: COMMERCIAL

## 2023-08-18 NOTE — OUTREACH NOTE
AMBULATORY CASE MANAGEMENT NOTE    Name and Relationship of Patient/Support Person: Amrik Trimble - Self    Amrik had a new A1c since we last spoke.  It went up a little to 8.6.  I reviewed that this means his blood sugars are running on average in the 250s.  He states his blood sugars run from 160 to low 200s when he is checking.  I encouraged him to check his blood sugars at a variety of times and the importance of checking it when he has consumed something that may elevate his blood sugars.     His triglycerides are very high (in 500s) and I stressed the importance of taking his cholesterol meds as prescribed on a regular basis.  I reviewed the importance of a low fat, low sodium diet that contains lots of fruits and vegetables.  He had no other needs at this time.      As with any education provided during the call, the patient was reminded to check with their MD before making any changes to their current health regimen.  Educated on availability of nurseline and its use.  All basic needs are met. No issue with social determinants.  No inabilities to obtain food or medications or transportation to MD appointments.  Educated patient on benefits of Employee CM program and invited to call with any new needs.          ISELA ZULUAGA  Ambulatory Case Management    8/18/2023, 12:44 EDT

## 2023-11-29 DIAGNOSIS — I10 PRIMARY HYPERTENSION: Primary | ICD-10-CM

## 2023-11-29 DIAGNOSIS — Z13.29 SCREENING FOR THYROID DISORDER: ICD-10-CM

## 2023-11-29 DIAGNOSIS — E11.65 UNCONTROLLED TYPE 2 DIABETES MELLITUS WITH HYPERGLYCEMIA: ICD-10-CM

## 2023-11-29 DIAGNOSIS — E55.9 VITAMIN D DEFICIENCY: ICD-10-CM

## 2023-11-30 ENCOUNTER — OFFICE VISIT (OUTPATIENT)
Dept: ORTHOPEDIC SURGERY | Facility: CLINIC | Age: 68
End: 2023-11-30
Payer: MEDICARE

## 2023-11-30 VITALS — BODY MASS INDEX: 36.24 KG/M2 | HEIGHT: 74 IN | WEIGHT: 282.4 LBS | TEMPERATURE: 98.2 F

## 2023-11-30 DIAGNOSIS — M16.11 PRIMARY OSTEOARTHRITIS OF RIGHT HIP: Primary | ICD-10-CM

## 2023-11-30 LAB
25(OH)D3+25(OH)D2 SERPL-MCNC: 34.7 NG/ML (ref 30–100)
ALBUMIN SERPL-MCNC: 4.5 G/DL (ref 3.5–5.2)
ALBUMIN/GLOB SERPL: 1.8 G/DL
ALP SERPL-CCNC: 81 U/L (ref 39–117)
ALT SERPL-CCNC: 40 U/L (ref 1–41)
AST SERPL-CCNC: 42 U/L (ref 1–40)
BASOPHILS # BLD AUTO: 0.04 10*3/MM3 (ref 0–0.2)
BASOPHILS NFR BLD AUTO: 0.5 % (ref 0–1.5)
BILIRUB SERPL-MCNC: 0.6 MG/DL (ref 0–1.2)
BUN SERPL-MCNC: 27 MG/DL (ref 8–23)
BUN/CREAT SERPL: 24.5 (ref 7–25)
CALCIUM SERPL-MCNC: 10.2 MG/DL (ref 8.6–10.5)
CHLORIDE SERPL-SCNC: 99 MMOL/L (ref 98–107)
CHOLEST SERPL-MCNC: 190 MG/DL (ref 0–200)
CO2 SERPL-SCNC: 26.4 MMOL/L (ref 22–29)
CREAT SERPL-MCNC: 1.1 MG/DL (ref 0.76–1.27)
EGFRCR SERPLBLD CKD-EPI 2021: 74 ML/MIN/1.73
EOSINOPHIL # BLD AUTO: 0.25 10*3/MM3 (ref 0–0.4)
EOSINOPHIL NFR BLD AUTO: 3.3 % (ref 0.3–6.2)
ERYTHROCYTE [DISTWIDTH] IN BLOOD BY AUTOMATED COUNT: 13.5 % (ref 12.3–15.4)
GLOBULIN SER CALC-MCNC: 2.5 GM/DL
GLUCOSE SERPL-MCNC: 223 MG/DL (ref 65–99)
HBA1C MFR BLD: 10.2 % (ref 4.8–5.6)
HCT VFR BLD AUTO: 44.2 % (ref 37.5–51)
HDLC SERPL-MCNC: 40 MG/DL (ref 40–60)
HGB BLD-MCNC: 14.6 G/DL (ref 13–17.7)
IMM GRANULOCYTES # BLD AUTO: 0.01 10*3/MM3 (ref 0–0.05)
IMM GRANULOCYTES NFR BLD AUTO: 0.1 % (ref 0–0.5)
LDLC SERPL CALC-MCNC: 43 MG/DL (ref 0–100)
LDLC/HDLC SERPL: <0 {RATIO}
LYMPHOCYTES # BLD AUTO: 3.16 10*3/MM3 (ref 0.7–3.1)
LYMPHOCYTES NFR BLD AUTO: 41.8 % (ref 19.6–45.3)
MCH RBC QN AUTO: 30.5 PG (ref 26.6–33)
MCHC RBC AUTO-ENTMCNC: 33 G/DL (ref 31.5–35.7)
MCV RBC AUTO: 92.5 FL (ref 79–97)
MONOCYTES # BLD AUTO: 0.51 10*3/MM3 (ref 0.1–0.9)
MONOCYTES NFR BLD AUTO: 6.7 % (ref 5–12)
NEUTROPHILS # BLD AUTO: 3.59 10*3/MM3 (ref 1.7–7)
NEUTROPHILS NFR BLD AUTO: 47.6 % (ref 42.7–76)
NRBC BLD AUTO-RTO: 0 /100 WBC (ref 0–0.2)
PLATELET # BLD AUTO: 181 10*3/MM3 (ref 140–450)
POTASSIUM SERPL-SCNC: 4.3 MMOL/L (ref 3.5–5.2)
PROT SERPL-MCNC: 7 G/DL (ref 6–8.5)
RBC # BLD AUTO: 4.78 10*6/MM3 (ref 4.14–5.8)
SODIUM SERPL-SCNC: 138 MMOL/L (ref 136–145)
T4 FREE SERPL-MCNC: 1.05 NG/DL (ref 0.93–1.7)
TRIGL SERPL-MCNC: 762 MG/DL (ref 0–150)
TSH SERPL DL<=0.005 MIU/L-ACNC: 5.57 UIU/ML (ref 0.27–4.2)
VLDLC SERPL CALC-MCNC: 107 MG/DL (ref 5–40)
WBC # BLD AUTO: 7.56 10*3/MM3 (ref 3.4–10.8)

## 2023-11-30 PROCEDURE — 99213 OFFICE O/P EST LOW 20 MIN: CPT | Performed by: NURSE PRACTITIONER

## 2023-11-30 NOTE — PROGRESS NOTES
Patient: Amrik Trimble  YOB: 1955 68 y.o. male  Medical Record Number: [id number]    Chief Complaint:   Chief Complaint   Patient presents with    Right Hip - Follow-up, Pain       History of Present Illness:Amrik Trimble is a 68 y.o. male who presents for follow-up of right hip pain that is chronic in nature.  Patient reports that his symptoms have worsened since seeing Dr. Lamont victor in June.  States he is in constant pain on a daily basis in his right hip/groin region.  States the pain radiates to his buttocks region and down his thigh occasionally.  States that this is greatly affecting his quality of life and he is ready to discuss what the next step in treatment options would be.    Allergies: No Known Allergies    Medications:   Current Outpatient Medications   Medication Sig Dispense Refill    Accu-Chek Guide test strip       ACETAMINOPHEN PO Take 1 tablet by mouth.      allopurinol (ZYLOPRIM) 100 MG tablet Take 1 tablet (100 mg total) by mouth 1 (one) time each day 90 tablet 3    aspirin 81 MG EC tablet Take 1 tablet by mouth.      Cholecalciferol 50 MCG (2000 UT) capsule Take 1 capsule by mouth Daily.      Cinnamon 500 MG capsule Take 2 capsules by mouth.      cloNIDine (CATAPRES) 0.1 MG tablet Take 2 tablets by mouth 3 (Three) Times a Day.      clotrimazole-betamethasone (LOTRISONE) 1-0.05 % cream Apply a thin layer to affected ears once daily for 5 days in a row, then for reoccurrence, use once daily for 1-2 days 45 g 1    Coenzyme Q10 200 MG capsule Take 1 tablet by mouth Daily.      dapagliflozin Propanediol (Farxiga) 10 MG tablet Take 1 tablet by mouth Daily. 90 tablet 1    dapagliflozin Propanediol (Farxiga) 10 MG tablet Take 10 mg by mouth Daily. 90 tablet 1    Easy Touch Lancets 30G/Twist misc       eplerenone (INSPRA) 50 MG tablet Take 1 tablet by mouth Daily.      glimepiride (AMARYL) 4 MG tablet Take 1 tablet by mouth 2 (Two) Times a Day. 180 tablet 1    glimepiride (AMARYL) 4  MG tablet Take 1 tablet by mouth Every Morning Before Breakfast. 90 tablet 1    HYDROcodone-acetaminophen (NORCO) 7.5-325 MG per tablet Take 1 tablet by mouth.      hydrocortisone 2.5 % ointment Apply a thin layer to affected areas by topical route twice daily. Safe to use on face. 454 g 11    icosapent ethyl (VASCEPA) 1 g capsule capsule TAKE 2 CAPSULES BY MOUTH TWO TIMES A DAY WITH MEALS      icosapent ethyl (VASCEPA) 1 g capsule capsule Take 1 capsule by mouth 2 (Two) Times a Day With Meals. 180 capsule 1    ketotifen ([employer] Allergy Eye Drops) 0.025 % ophthalmic solution Administer 1 drop to both eyes 2 (Two) Times a Day As Needed for itch 10 mL 12    ketotifen ([employer] Allergy Eye Drops) 0.025 % ophthalmic solution Location: Both Eyes. Instill one drop in each affected eye as needed. 10 mL 11    ketotifen ([employer] Allergy Eye Drops) 0.025 % ophthalmic solution Administer 1 drop to both eyes 2 (Two) Times a Day As Needed. 10 mL 11    ketotifen ([employer] Allergy Eye Drops) 0.025 % ophthalmic solution Instill 1 drop to affected eye 2 (Two) Times a Day As Needed. 10 mL 11    metFORMIN ER (GLUCOPHAGE-XR) 500 MG 24 hr tablet Take 2 tablets by mouth 2 (Two) Times a Day. 360 tablet 1    metFORMIN ER (GLUCOPHAGE-XR) 500 MG 24 hr tablet Take 2 tablets by mouth 2 (Two) Times a Day. 360 tablet 1    metoprolol succinate XL (TOPROL-XL) 50 MG 24 hr tablet 2 tablets 2 (Two) Times a Day.      multivitamin (THERAGRAN) tablet tablet Take 1 tablet by mouth Daily.      mupirocin (BACTROBAN) 2 % ointment Apply to affected area twice daily to open wounds. Use with hydrocortisone 22 g 2    olmesartan (BENICAR) 40 MG tablet Take 1 tablet by mouth Daily.      pimecrolimus (Elidel) 1 % cream Apply a thin layer to affected area twice daily until cleared. 100 g 3    pimecrolimus (ELIDEL) 1 % cream Use twice daily to areas of flare. 60 g 5    rosuvastatin (CRESTOR) 20 MG tablet Take 1 tablet by mouth Daily.      tacrolimus (PROTOPIC) 0.1 % ointment Apply a  "thin layer to affected area twice daily. 100 g 11    tacrolimus (PROTOPIC) 0.1 % ointment Apply twice daily to affected area 100 g 11    terazosin (HYTRIN) 5 MG capsule Take 1 capsule by mouth Every Night.      thiamine (VITAMIN B1) 100 MG tablet Take 1 tablet by mouth.      torsemide (DEMADEX) 10 MG tablet Take 1 tablet by mouth Daily. 90 tablet 3     No current facility-administered medications for this visit.         The following portions of the patient's history were reviewed and updated as appropriate: allergies, current medications, past family history, past medical history, past social history, past surgical history and problem list.    Review of Systems:   Pertinent positives/negative listed in HPI above    Physical Exam:   Vitals:    11/30/23 1601   Temp: 98.2 °F (36.8 °C)   TempSrc: Temporal   Weight: 128 kg (282 lb 6.4 oz)   Height: 188 cm (74\")   PainSc:   7   PainLoc: Hip       General: A and O x 3, ASA, NAD      Hip:  right    LEG ALIGNMENT:     Neutral        LEG LENGTH DISCREPANCY   :    none    GAIT:     Antalgic    SKIN:     No abnormality    RANGE OF MOTION:     Limited by joint irritability    STRENGTH:     Limited by joint irratibility    DISTAL PULSES:    Paplable    DISTAL SENSATION :   Intact    LYMPHATICS:     No   lymphadenopathy    OTHER:          +   Stinchfeld test      -    log roll      -   Tenderness to palpation trochanteric bursa      Radiology:    Xrays 2views right hip (AP bilateral hips and lateral hip) taken previously demonstrating moderate joint space narrowing with periarticular osteophytes present.  Comparison views: No new x-rays taken today    Right hip MRI:  Report shows degenerative changes with a labral tear    Assessment/Plan: Right Hip pain/ Hip OA    Treatment options as well as imaging results were discussed in detail with the patient.  Patient's symptoms are pretty debilitating and I think it would probably benefit from proceeding forward with total hip " arthroplasty.  Patient however is a type II diabetic and his most recent A1c taken yesterday was 10.20.  He also has a cardiac history with 2 stents.  Patient will need to be medically optimized before we could proceed forward with surgery.  Patient is instructed to notify us once his A1c is less than 8 as well as having cardiac clearance.  He can proceed forward once these 2 requirements have been met.      Michele Hernández, APRN  11/30/2023

## 2023-12-04 ENCOUNTER — OFFICE VISIT (OUTPATIENT)
Dept: FAMILY MEDICINE CLINIC | Facility: CLINIC | Age: 68
End: 2023-12-04
Payer: COMMERCIAL

## 2023-12-04 VITALS
RESPIRATION RATE: 16 BRPM | HEART RATE: 90 BPM | WEIGHT: 284.39 LBS | OXYGEN SATURATION: 95 % | HEIGHT: 74 IN | BODY MASS INDEX: 36.5 KG/M2 | TEMPERATURE: 98 F | DIASTOLIC BLOOD PRESSURE: 70 MMHG | SYSTOLIC BLOOD PRESSURE: 130 MMHG

## 2023-12-04 DIAGNOSIS — E66.01 CLASS 2 SEVERE OBESITY DUE TO EXCESS CALORIES WITH SERIOUS COMORBIDITY AND BODY MASS INDEX (BMI) OF 36.0 TO 36.9 IN ADULT: ICD-10-CM

## 2023-12-04 DIAGNOSIS — E11.65 UNCONTROLLED TYPE 2 DIABETES MELLITUS WITH HYPERGLYCEMIA: ICD-10-CM

## 2023-12-04 DIAGNOSIS — I10 PRIMARY HYPERTENSION: Primary | ICD-10-CM

## 2023-12-04 DIAGNOSIS — G47.33 OBSTRUCTIVE SLEEP APNEA: ICD-10-CM

## 2023-12-04 DIAGNOSIS — E78.2 MIXED HYPERLIPIDEMIA: ICD-10-CM

## 2023-12-04 DIAGNOSIS — E03.8 SUBCLINICAL HYPOTHYROIDISM: ICD-10-CM

## 2023-12-04 PROBLEM — R79.89 ABNORMAL TSH: Status: ACTIVE | Noted: 2023-07-31

## 2023-12-04 NOTE — PROGRESS NOTES
Subjective     Amrik Trimble is a 68 y.o.. male.     Patient here today for follow up on hypertension, CAD (seeing Dr. Lee), right hip pain d/t labral tearing and likely some mild to moderate osteoarthritis (seeing Dr. Miguel), MICHAEL with CPAP. Patient stating he was seen in ortho office on Friday; Patient stating he is needing to work on getting HgA1c down so that he can get hip replacement, goal time for surgery is after February. Patient stating he saw Allergist/derm for rash to scalp and it cleared up nicely after treatment. Patient stating his at home b/ps run around 120-140's/70-80; home blood sugars run around 200-400 in am. Patient stating he is not eating healthy, is drinking water through out day, drinking ice tea and sodas occasionally and does not exercise. Patient stating he has an appt with [facility], this week.        The following portions of the patient's history were reviewed and updated as appropriate: allergies, current medications, past family history, past medical history, past social history, past surgical history and problem list.    Past Medical History:   Diagnosis Date    Age-related cataract of both eyes 12/27/2017    Ankle sprain     Aortic stenosis, mild 03/08/2021    Arthritis 07/08/2020    Formatting of this note might be different from the original. Added automatically from request for surgery [id number]    CAD (coronary artery disease) 05/10/2013    CTS (carpal tunnel syndrome)     Gout 04/24/2023    HTN (hypertension) 05/10/2013    Hyperkalemia 11/18/2021    Hyperlipidemia 05/10/2013    Knee sprain     Left carotid bruit 03/08/2021    Low back strain     Non-ST elevation MI (NSTEMI) 02/01/2012    Obesity 07/30/2014    Obstructive sleep apnea 04/24/2023    Formatting of this note might be different from the original. compliant    Osteoarthritis of both knees 02/10/2022    Periarthritis of shoulder     Proteinuria 02/15/2016    S/P PTCA (percutaneous transluminal coronary angioplasty)  "07/30/2014    Smoking hx 05/10/2013    Stage 3 chronic kidney disease 11/18/2021    Tear of medial meniscus of knee 12/22/2015    Formatting of this note might be different from the original. Added automatically from request for surgery [id number]    Tear of meniscus of knee     Type 2 diabetes mellitus with diabetic chronic kidney disease 11/18/2021    Uncontrolled type 2 diabetes mellitus with hyperglycemia 01/02/2015    Vitamin D deficiency 03/17/2014    Wrist sprain        Past Surgical History:   Procedure Laterality Date    CORONARY ANGIOPLASTY      with stents    ELBOW ARTHROPLASTY Right     ELBOW PROCEDURE      HAND SURGERY Left     INGUINAL HERNIA REPAIR Bilateral     KIDNEY STONE SURGERY      KNEE SURGERY      MENISCECTOMY Bilateral     ROTATOR CUFF REPAIR Left     ROTATOR CUFF REPAIR Right     x 2    SHOULDER SURGERY      THUMB ARTHROSCOPY Left     re-attachement    WRIST SURGERY         Review of Systems   Constitutional:  Positive for fatigue. Negative for fever.   Respiratory: Negative.     Cardiovascular: Negative.    Gastrointestinal: Negative.    Neurological: Negative.    Psychiatric/Behavioral: Negative.         No Known Allergies    Objective     Vitals:    12/04/23 0807 12/04/23 0834   BP: 136/74 130/70   Pulse: 90    Resp: 16    Temp: 98 °F (36.7 °C)    SpO2: 95%    Weight: 129 kg (284 lb 6.3 oz)    Height: 188 cm (74.02\")      Body mass index is 36.5 kg/m².    Physical Exam  Vitals reviewed.   HENT:      Head: Normocephalic.   Eyes:      Pupils: Pupils are equal, round, and reactive to light.   Neck:      Vascular: No carotid bruit.   Cardiovascular:      Rate and Rhythm: Normal rate and regular rhythm.      Pulses: Normal pulses.   Pulmonary:      Effort: Pulmonary effort is normal.      Breath sounds: Normal breath sounds.   Musculoskeletal:      Right lower leg: Edema (trace) present.      Left lower leg: Edema (trace) present.   Skin:     General: Skin is warm and dry.   Neurological:      " Mental Status: He is alert and oriented to person, place, and time.   Psychiatric:         Behavior: Behavior normal.           Current Outpatient Medications:     Accu-Chek Guide test strip, , Disp: , Rfl:     ACETAMINOPHEN PO, Take 1 tablet by mouth., Disp: , Rfl:     allopurinol (ZYLOPRIM) 100 MG tablet, Take 1 tablet (100 mg total) by mouth 1 (one) time each day, Disp: 90 tablet, Rfl: 3    aspirin 81 MG EC tablet, Take 1 tablet by mouth., Disp: , Rfl:     Cholecalciferol 50 MCG (2000 UT) capsule, Take 1 capsule by mouth Daily., Disp: , Rfl:     Cinnamon 500 MG capsule, Take 2 capsules by mouth., Disp: , Rfl:     cloNIDine (CATAPRES) 0.1 MG tablet, Take 2 tablets by mouth 3 (Three) Times a Day., Disp: , Rfl:     clotrimazole-betamethasone (LOTRISONE) 1-0.05 % cream, Apply a thin layer to affected ears once daily for 5 days in a row, then for reoccurrence, use once daily for 1-2 days, Disp: 45 g, Rfl: 1    Coenzyme Q10 200 MG capsule, Take 1 tablet by mouth Daily., Disp: , Rfl:     dapagliflozin Propanediol (Farxiga) 10 MG tablet, Take 1 tablet by mouth Daily., Disp: 90 tablet, Rfl: 1    Easy Touch Lancets 30G/Twist misc, , Disp: , Rfl:     eplerenone (INSPRA) 50 MG tablet, Take 1 tablet by mouth Daily., Disp: , Rfl:     glimepiride (AMARYL) 4 MG tablet, Take 1 tablet by mouth Every Morning Before Breakfast., Disp: 90 tablet, Rfl: 1    HYDROcodone-acetaminophen (NORCO) 7.5-325 MG per tablet, Take 1 tablet by mouth., Disp: , Rfl:     hydrocortisone 2.5 % ointment, Apply a thin layer to affected areas by topical route twice daily. Safe to use on face., Disp: 454 g, Rfl: 11    icosapent ethyl (VASCEPA) 1 g capsule capsule, TAKE 2 CAPSULES BY MOUTH TWO TIMES A DAY WITH MEALS, Disp: , Rfl:     ketotifen ([employer] Allergy Eye Drops) 0.025 % ophthalmic solution, Location: Both Eyes. Instill one drop in each affected eye as needed., Disp: 10 mL, Rfl: 11    metFORMIN ER (GLUCOPHAGE-XR) 500 MG 24 hr tablet, Take 2 tablets by  mouth 2 (Two) Times a Day., Disp: 360 tablet, Rfl: 1    metoprolol succinate XL (TOPROL-XL) 50 MG 24 hr tablet, 2 tablets 2 (Two) Times a Day., Disp: , Rfl:     multivitamin (THERAGRAN) tablet tablet, Take 1 tablet by mouth Daily., Disp: , Rfl:     mupirocin (BACTROBAN) 2 % ointment, Apply to affected area twice daily to open wounds. Use with hydrocortisone, Disp: 22 g, Rfl: 2    olmesartan (BENICAR) 40 MG tablet, Take 1 tablet by mouth Daily., Disp: , Rfl:     pimecrolimus (ELIDEL) 1 % cream, Use twice daily to areas of flare., Disp: 60 g, Rfl: 5    rosuvastatin (CRESTOR) 20 MG tablet, Take 1 tablet by mouth Daily., Disp: , Rfl:     tacrolimus (PROTOPIC) 0.1 % ointment, Apply a thin layer to affected area twice daily., Disp: 100 g, Rfl: 11    tacrolimus (PROTOPIC) 0.1 % ointment, Apply twice daily to affected area, Disp: 100 g, Rfl: 11    terazosin (HYTRIN) 5 MG capsule, Take 1 capsule by mouth Every Night., Disp: , Rfl:     thiamine (VITAMIN B1) 100 MG tablet, Take 1 tablet by mouth., Disp: , Rfl:     torsemide (DEMADEX) 10 MG tablet, Take 1 tablet by mouth Daily., Disp: 90 tablet, Rfl: 3    Recent Results (from the past 2016 hour(s))   Comprehensive Metabolic Panel    Collection Time: 11/29/23  8:36 AM    Specimen: Blood   Result Value Ref Range    Glucose 223 (H) 65 - 99 mg/dL    BUN 27 (H) 8 - 23 mg/dL    Creatinine 1.10 0.76 - 1.27 mg/dL    EGFR Result 74.0 >60.0 mL/min/1.73    BUN/Creatinine Ratio 24.5 7.0 - 25.0    Sodium 138 136 - 145 mmol/L    Potassium 4.3 3.5 - 5.2 mmol/L    Chloride 99 98 - 107 mmol/L    Total CO2 26.4 22.0 - 29.0 mmol/L    Calcium 10.2 8.6 - 10.5 mg/dL    Total Protein 7.0 6.0 - 8.5 g/dL    Albumin 4.5 3.5 - 5.2 g/dL    Globulin 2.5 gm/dL    A/G Ratio 1.8 g/dL    Total Bilirubin 0.6 0.0 - 1.2 mg/dL    Alkaline Phosphatase 81 39 - 117 U/L    AST (SGOT) 42 (H) 1 - 40 U/L    ALT (SGPT) 40 1 - 41 U/L   Lipid Panel With LDL / HDL Ratio    Collection Time: 11/29/23  8:36 AM    Specimen:  Blood   Result Value Ref Range    Total Cholesterol 190 0 - 200 mg/dL    Triglycerides 762 (H) 0 - 150 mg/dL    HDL Cholesterol 40 40 - 60 mg/dL    VLDL Cholesterol Charles 107 (H) 5 - 40 mg/dL    LDL Chol Calc (NIH) 43 0 - 100 mg/dL    LDL/HDL RATIO <0.00    TSH Rfx On Abnormal To Free T4    Collection Time: 11/29/23  8:36 AM    Specimen: Blood   Result Value Ref Range    TSH 5.570 (H) 0.270 - 4.200 uIU/mL   Hemoglobin A1c    Collection Time: 11/29/23  8:36 AM    Specimen: Blood   Result Value Ref Range    Hemoglobin A1C 10.20 (H) 4.80 - 5.60 %   Vitamin D,25-Hydroxy    Collection Time: 11/29/23  8:36 AM    Specimen: Blood   Result Value Ref Range    25 Hydroxy, Vitamin D 34.7 30.0 - 100.0 ng/ml   CBC & Differential    Collection Time: 11/29/23  8:36 AM   Result Value Ref Range    WBC 7.56 3.40 - 10.80 10*3/mm3    RBC 4.78 4.14 - 5.80 10*6/mm3    Hemoglobin 14.6 13.0 - 17.7 g/dL    Hematocrit 44.2 37.5 - 51.0 %    MCV 92.5 79.0 - 97.0 fL    MCH 30.5 26.6 - 33.0 pg    MCHC 33.0 31.5 - 35.7 g/dL    RDW 13.5 12.3 - 15.4 %    Platelets 181 140 - 450 10*3/mm3    Neutrophil Rel % 47.6 42.7 - 76.0 %    Lymphocyte Rel % 41.8 19.6 - 45.3 %    Monocyte Rel % 6.7 5.0 - 12.0 %    Eosinophil Rel % 3.3 0.3 - 6.2 %    Basophil Rel % 0.5 0.0 - 1.5 %    Neutrophils Absolute 3.59 1.70 - 7.00 10*3/mm3    Lymphocytes Absolute 3.16 (H) 0.70 - 3.10 10*3/mm3    Monocytes Absolute 0.51 0.10 - 0.90 10*3/mm3    Eosinophils Absolute 0.25 0.00 - 0.40 10*3/mm3    Basophils Absolute 0.04 0.00 - 0.20 10*3/mm3    Immature Granulocyte Rel % 0.1 0.0 - 0.5 %    Immature Grans Absolute 0.01 0.00 - 0.05 10*3/mm3    nRBC 0.0 0.0 - 0.2 /100 WBC   T4F    Collection Time: 11/29/23  8:36 AM   Result Value Ref Range    Free T4 1.05 0.93 - 1.70 ng/dL   CBC AND DIFFERENTIAL    Collection Time: 11/30/23 11:43 AM    Specimen type and source: Whole Blood, Blood   Result Value Ref Range    WBC 7.32 4.5 - 11.0 10*3/uL    RBC 4.89 4.5 - 5.9 10*6/uL    Hemoglobin 14.6  13.5 - 17.5 g/dL    Hematocrit 45.9 41.0 - 53.0 %    MCV 93.9 80.0 - 100.0 fL    MCH 29.9 26.0 - 34.0 pg    MCHC 31.8 31.0 - 37.0 g/dL    RDW 13.7 12.0 - 16.8 %    Platelets 183 140 - 440 10*3/uL    MPV 11.9 8.4 - 12.4 fL    Differential Type Physician Office CBC w/AutoDiff (arb'U)    Neutrophil Rel % 46.3 45 - 80 %    Basophil Rel % 0.4 0 - 2 %    Basophils Absolute 0.03 0.0 - 0.2 10*3/uL    Eosinophil % 2.7 0 - 7 %    Eosinophils Absolute 0.20 0.0 - 0.8 10*3/uL    Immature Grans % 0.1 0.0 - 1.0 %    Immature Grans, Absolute 0.01 0.00 - 0.10 10*3/uL    Lymphocytes Absolute 3.09 0.7 - 5.5 10*3/uL    Lymphocyte Rel % 42.2 15 - 50 %    Monocytes Absolute 0.61 0.0 - 1.7 10*3/uL    Monocyte Rel % 8.3 0 - 15 %    Neutrophils Absolute 3.38 2.0 - 8.8 10*3/uL   HEMOGLOBIN A1C    Collection Time: 11/30/23 11:43 AM    Specimen type and source: Whole Blood, Blood   Result Value Ref Range    Hemoglobin A1C 10.1 (H) 4.3 - 5.6 %    Mean Bld Glu Estim. 243 mg/dL   VITAMIN D,25-HYDROXY    Collection Time: 11/30/23 11:43 AM    Specimen type and source: Serum, Blood   Result Value Ref Range    25 Hydroxy, Vitamin D 41.9 >30 ng/mL   T4, FREE    Collection Time: 11/30/23 11:43 AM    Specimen type and source: Serum, Blood   Result Value Ref Range    Free T4 1.13 0.93 - 1.70 ng/dL   C-PEPTIDE    Collection Time: 11/30/23 11:43 AM    Specimen: Fresh Frozen Plasma    Specimen type and source: Plasma, Blood   Result Value Ref Range    Cyclic Citrullinated Peptide Antibody 5.8 (H) 0.8 - 5.2 ng/mL   T3    Collection Time: 11/30/23 11:43 AM    Specimen: Fresh Frozen Plasma    Specimen type and source: Plasma, Blood   Result Value Ref Range    T3, Total 0.90 0.580 - 1.590 ng/mL       CT Chest Low Dose Cancer Screening WO  Narrative: LOW-DOSE CHEST CT WITHOUT IV CONTRAST     HISTORY: 67-year-old male. Lung cancer screening.     TECHNIQUE: Radiation dose reduction techniques were utilized, including  automated exposure control and exposure  modulation based on body size.  Low-dose chest CT protocol with 2 mm intervals. There are no priors for  comparison.     FINDINGS: There is a sub-6 mm pleural-based nodule at the left lower  lobe, image 112. There is a nodular focus of pleural parenchymal  thickening at the left upper lobe, image 38. There are no suspicious  pulmonary opacities and there are no pleural or pericardial effusions.  There is no lymphadenopathy within the chest. Extensive coronary artery  calcifications are noted.     Impression: 1.There are no suspicious pulmonary opacities or nodules. LungRADS  category 2. Screening low-dose chest CT is recommended in 12 months.  2. CTDI 3.0 mGy. .5 mGycm.     This report was finalized on 8/7/2023 1:00 PM by Dr. Arina Garcia M.D.           Diagnoses and all orders for this visit:    1. Primary hypertension (Primary)    2. Mixed hyperlipidemia    3. Class 2 severe obesity due to excess calories with serious comorbidity and body mass index (BMI) of 36.0 to 36.9 in adult    4. Uncontrolled type 2 diabetes mellitus with hyperglycemia    5. Subclinical hypothyroidism    6. Obstructive sleep apnea      Patient Instructions   Hypertension: borderline stable, continue clonidine 0.1 mg 2 tabs three times a day, eplerenone 50 mg 1 tab daily, Toprol XL 50 mg 2 tabs twice a day, and olmesartan 40 mg 1 Tab daily. Continue to follow up with Dr. Lee as directed/as needed    Hyperlipidemia: LDL wnl at 43, Triglyceride 762 and VLDL 107; Patient stating he lowered his vascepa 1 gm from 2 tabs twice a day to 1 tab twice a day, he is still taking his rosuvastatin 20 mg daily ; recommend Patient to work on eating a heart healthy low fat low sugar diet, to start taking Vascepa as previous 1 gm 2 tabs twice a day and continue rosuvastatin 20 mg daily. Recommend Patient to start exercise routine 2 times a week for more than 30 minutes at a time (discussed stationary bike or hand pedal exerciser. Recheck in 6  months.    Obesity: discussed diet and exercise in depth with Patient today.     Diabetes: uncontrolled; Patient to follow up with Endo this week; at this time continue farxiga 10 mg daily, glimepiride 4 mg 1 tab daily, and metformin  mg 2 tabs twice a day. Discussed diabetes diet in detail today. Discussed how exercise could help with his blood sugars during todays visit.     Subclinical hypothyroidism: TSH 5.57 and Patient c/o fatigue. Recommend Patient to discuss with Endo; do believe a low dose synthroid would be beneficial.     MICHAEL: Patient stating he is using his CPAP nightly.     Return for fasting labs in 6 months, [employer] [facility].

## 2023-12-04 NOTE — PATIENT INSTRUCTIONS
Hypertension: borderline stable, continue clonidine 0.1 mg 2 tabs three times a day, eplerenone 50 mg 1 tab daily, Toprol XL 50 mg 2 tabs twice a day, and olmesartan 40 mg 1 Tab daily. Continue to follow up with Dr. Lee as directed/as needed    Hyperlipidemia: LDL wnl at 43, Triglyceride 762 and VLDL 107; Patient stating he lowered his vascepa 1 gm from 2 tabs twice a day to 1 tab twice a day, he is still taking his rosuvastatin 20 mg daily ; recommend Patient to work on eating a heart healthy low fat low sugar diet, to start taking Vascepa as previous 1 gm 2 tabs twice a day and continue rosuvastatin 20 mg daily. Recommend Patient to start exercise routine 2 times a week for more than 30 minutes at a time (discussed stationary bike or hand pedal exerciser. Recheck in 6 months.    Obesity: discussed diet and exercise in depth with Patient today.     Diabetes: uncontrolled; Patient to follow up with Endo this week; at this time continue farxiga 10 mg daily, glimepiride 4 mg 1 tab daily, and metformin  mg 2 tabs twice a day. Discussed diabetes diet in detail today. Discussed how exercise could help with his blood sugars during todays visit.     Subclinical hypothyroidism: TSH 5.57 and Patient c/o fatigue. Recommend Patient to discuss with Endo; do believe a low dose synthroid would be beneficial.     MICHAEL: Patient stating he is using his CPAP nightly.

## 2024-01-24 ENCOUNTER — PATIENT OUTREACH (OUTPATIENT)
Dept: CASE MANAGEMENT | Facility: OTHER | Age: 69
End: 2024-01-24
Payer: MEDICARE

## 2024-06-05 ENCOUNTER — LAB (OUTPATIENT)
Dept: FAMILY MEDICINE CLINIC | Facility: CLINIC | Age: 69
End: 2024-06-05
Payer: MEDICARE

## 2024-06-05 DIAGNOSIS — E78.2 MIXED HYPERLIPIDEMIA: Primary | ICD-10-CM

## 2024-06-05 DIAGNOSIS — E11.65 UNCONTROLLED TYPE 2 DIABETES MELLITUS WITH HYPERGLYCEMIA: ICD-10-CM

## 2024-06-05 DIAGNOSIS — E03.8 SUBCLINICAL HYPOTHYROIDISM: ICD-10-CM

## 2024-06-06 LAB
ALBUMIN SERPL-MCNC: 4.3 G/DL (ref 3.9–4.9)
ALBUMIN/CREAT UR: 160 MG/G CREAT (ref 0–29)
ALBUMIN/GLOB SERPL: 1.7 {RATIO} (ref 1.2–2.2)
ALP SERPL-CCNC: 70 IU/L (ref 44–121)
ALT SERPL-CCNC: 32 IU/L (ref 0–44)
AST SERPL-CCNC: 28 IU/L (ref 0–40)
BASOPHILS # BLD AUTO: 0 X10E3/UL (ref 0–0.2)
BASOPHILS NFR BLD AUTO: 0 %
BILIRUB SERPL-MCNC: 0.7 MG/DL (ref 0–1.2)
BUN SERPL-MCNC: 22 MG/DL (ref 8–27)
BUN/CREAT SERPL: 21 (ref 10–24)
CALCIUM SERPL-MCNC: 10.2 MG/DL (ref 8.6–10.2)
CHLORIDE SERPL-SCNC: 104 MMOL/L (ref 96–106)
CHOLEST SERPL-MCNC: 137 MG/DL (ref 100–199)
CO2 SERPL-SCNC: 20 MMOL/L (ref 20–29)
CREAT SERPL-MCNC: 1.05 MG/DL (ref 0.76–1.27)
CREAT UR-MCNC: 33.4 MG/DL
EGFRCR SERPLBLD CKD-EPI 2021: 78 ML/MIN/1.73
EOSINOPHIL # BLD AUTO: 0.3 X10E3/UL (ref 0–0.4)
EOSINOPHIL NFR BLD AUTO: 4 %
ERYTHROCYTE [DISTWIDTH] IN BLOOD BY AUTOMATED COUNT: 13.3 % (ref 11.6–15.4)
GLOBULIN SER CALC-MCNC: 2.6 G/DL (ref 1.5–4.5)
GLUCOSE SERPL-MCNC: 203 MG/DL (ref 70–99)
HBA1C MFR BLD: 8.6 % (ref 4.8–5.6)
HCT VFR BLD AUTO: 43.3 % (ref 37.5–51)
HDLC SERPL-MCNC: 44 MG/DL
HGB BLD-MCNC: 14.2 G/DL (ref 13–17.7)
IMM GRANULOCYTES # BLD AUTO: 0 X10E3/UL (ref 0–0.1)
IMM GRANULOCYTES NFR BLD AUTO: 0 %
LDLC SERPL CALC-MCNC: 42 MG/DL (ref 0–99)
LDLC/HDLC SERPL: 1 RATIO (ref 0–3.6)
LYMPHOCYTES # BLD AUTO: 3.3 X10E3/UL (ref 0.7–3.1)
LYMPHOCYTES NFR BLD AUTO: 41 %
MCH RBC QN AUTO: 30 PG (ref 26.6–33)
MCHC RBC AUTO-ENTMCNC: 32.8 G/DL (ref 31.5–35.7)
MCV RBC AUTO: 91 FL (ref 79–97)
MICROALBUMIN UR-MCNC: 53.5 UG/ML
MONOCYTES # BLD AUTO: 0.6 X10E3/UL (ref 0.1–0.9)
MONOCYTES NFR BLD AUTO: 8 %
NEUTROPHILS # BLD AUTO: 3.8 X10E3/UL (ref 1.4–7)
NEUTROPHILS NFR BLD AUTO: 47 %
PLATELET # BLD AUTO: 183 X10E3/UL (ref 150–450)
POTASSIUM SERPL-SCNC: 4.4 MMOL/L (ref 3.5–5.2)
PROT SERPL-MCNC: 6.9 G/DL (ref 6–8.5)
RBC # BLD AUTO: 4.74 X10E6/UL (ref 4.14–5.8)
SODIUM SERPL-SCNC: 139 MMOL/L (ref 134–144)
TRIGL SERPL-MCNC: 346 MG/DL (ref 0–149)
TSH SERPL DL<=0.005 MIU/L-ACNC: 4.49 UIU/ML (ref 0.45–4.5)
VLDLC SERPL CALC-MCNC: 51 MG/DL (ref 5–40)
WBC # BLD AUTO: 8 X10E3/UL (ref 3.4–10.8)

## 2024-06-10 ENCOUNTER — OFFICE VISIT (OUTPATIENT)
Dept: FAMILY MEDICINE CLINIC | Facility: CLINIC | Age: 69
End: 2024-06-10
Payer: MEDICARE

## 2024-06-10 VITALS
TEMPERATURE: 96.9 F | WEIGHT: 284.4 LBS | HEART RATE: 75 BPM | DIASTOLIC BLOOD PRESSURE: 80 MMHG | SYSTOLIC BLOOD PRESSURE: 132 MMHG | HEIGHT: 74 IN | RESPIRATION RATE: 16 BRPM | BODY MASS INDEX: 36.5 KG/M2 | OXYGEN SATURATION: 97 %

## 2024-06-10 DIAGNOSIS — E11.65 UNCONTROLLED TYPE 2 DIABETES MELLITUS WITH HYPERGLYCEMIA: ICD-10-CM

## 2024-06-10 DIAGNOSIS — Z00.00 MEDICARE ANNUAL WELLNESS VISIT, SUBSEQUENT: Primary | ICD-10-CM

## 2024-06-10 DIAGNOSIS — E78.2 MIXED HYPERLIPIDEMIA: ICD-10-CM

## 2024-06-10 DIAGNOSIS — I10 PRIMARY HYPERTENSION: ICD-10-CM

## 2024-06-10 PROBLEM — E03.9 HYPOTHYROIDISM: Status: ACTIVE | Noted: 2024-04-12

## 2024-06-10 LAB
URATE SERPL-MCNC: 5.7 MG/DL (ref 3.8–8.4)
WRITTEN AUTHORIZATION: NORMAL

## 2024-06-10 PROCEDURE — 3079F DIAST BP 80-89 MM HG: CPT | Performed by: NURSE PRACTITIONER

## 2024-06-10 PROCEDURE — 99213 OFFICE O/P EST LOW 20 MIN: CPT | Performed by: NURSE PRACTITIONER

## 2024-06-10 PROCEDURE — 1160F RVW MEDS BY RX/DR IN RCRD: CPT | Performed by: NURSE PRACTITIONER

## 2024-06-10 PROCEDURE — 3052F HG A1C>EQUAL 8.0%<EQUAL 9.0%: CPT | Performed by: NURSE PRACTITIONER

## 2024-06-10 PROCEDURE — 1126F AMNT PAIN NOTED NONE PRSNT: CPT | Performed by: NURSE PRACTITIONER

## 2024-06-10 PROCEDURE — 1170F FXNL STATUS ASSESSED: CPT | Performed by: NURSE PRACTITIONER

## 2024-06-10 PROCEDURE — G0439 PPPS, SUBSEQ VISIT: HCPCS | Performed by: NURSE PRACTITIONER

## 2024-06-10 PROCEDURE — 1159F MED LIST DOCD IN RCRD: CPT | Performed by: NURSE PRACTITIONER

## 2024-06-10 PROCEDURE — 3075F SYST BP GE 130 - 139MM HG: CPT | Performed by: NURSE PRACTITIONER

## 2024-06-10 RX ORDER — METOPROLOL SUCCINATE 50 MG/1
50 TABLET, EXTENDED RELEASE ORAL 2 TIMES DAILY
Qty: 180 TABLET | Refills: 1 | Status: SHIPPED | OUTPATIENT
Start: 2024-06-10

## 2024-06-10 RX ORDER — ROSUVASTATIN CALCIUM 20 MG/1
20 TABLET, COATED ORAL DAILY
Qty: 90 TABLET | Refills: 1 | Status: SHIPPED | OUTPATIENT
Start: 2024-06-10

## 2024-06-10 RX ORDER — OLMESARTAN MEDOXOMIL 40 MG/1
40 TABLET ORAL DAILY
Qty: 90 TABLET | Refills: 1 | Status: SHIPPED | OUTPATIENT
Start: 2024-06-10

## 2024-06-10 NOTE — PROGRESS NOTES
The ABCs of the Annual Wellness Visit  Subsequent Medicare Wellness Visit    Subjective    Amrik Trimble is a 68 y.o. male who presents for a Subsequent Medicare Wellness Visit.    The following portions of the patient's history were reviewed and   updated as appropriate: allergies, current medications, past family history, past medical history, past social history, past surgical history, and problem list.    Compared to one year ago, the patient feels his physical   health is better.    Compared to one year ago, the patient feels his mental   health is the same.    Recent Hospitalizations:  He was not admitted to the hospital during the last year.       Current Medical Providers:  Patient Care Team:  Rupinder Link APRN as PCP - General (Family Medicine)  Deysi Cuevas, ASTRID as Ambulatory  ([facility])    Outpatient Medications Prior to Visit   Medication Sig Dispense Refill    Accu-Chek Guide test strip       ACETAMINOPHEN PO Take 1 tablet by mouth.      allopurinol (ZYLOPRIM) 100 MG tablet Take 1 tablet (100 mg total) by mouth 1 (one) time each day 90 tablet 3    aspirin 81 MG EC tablet Take 1 tablet by mouth.      Cholecalciferol 50 MCG (2000 UT) capsule Take 1 capsule by mouth Daily.      Cinnamon 500 MG capsule Take 2 capsules by mouth.      cloNIDine (CATAPRES) 0.1 MG tablet Take 2 tablets by mouth 3 (Three) Times a Day.      clotrimazole-betamethasone (LOTRISONE) 1-0.05 % cream Apply a thin layer to affected ears once daily for 5 days in a row, then for reoccurrence, use once daily for 1-2 days 45 g 1    Coenzyme Q10 200 MG capsule Take 1 tablet by mouth Daily.      dapagliflozin Propanediol (Farxiga) 10 MG tablet Take 1 tablet by mouth Daily. 90 tablet 1    Easy Touch Lancets 30G/Twist misc       eplerenone (INSPRA) 50 MG tablet Take 1 tablet by mouth Daily.      glimepiride (AMARYL) 4 MG tablet Take 1 tablet by mouth 2 (Two) Times a Day With Meals. 90 tablet 1     HYDROcodone-acetaminophen (NORCO) 7.5-325 MG per tablet Take 1 tablet by mouth.      hydrocortisone 2.5 % ointment Apply a thin layer to affected areas by topical route twice daily. Safe to use on face. 454 g 11    icosapent ethyl (VASCEPA) 1 g capsule capsule Take 1 g by mouth 2 (Two) Times a Day With Meals. 180 capsule 1    Insulin Degludec FlexTouch 200 UNIT/ML solution pen-injector Inject 24 Units under the skin into the appropriate area as directed Daily. 9 mL 5    Insulin Pen Needle 32G X 4 MM misc Use 1 each up to 4 (four) times daily. 200 each 5    ketotifen ([employer] Allergy Eye Drops) 0.025 % ophthalmic solution Location: Both Eyes. Instill one drop in each affected eye as needed. 10 mL 11    levothyroxine (SYNTHROID, LEVOTHROID) 50 MCG tablet Take 1 tablet by mouth Daily. 90 tablet 1    metFORMIN ER (GLUCOPHAGE-XR) 500 MG 24 hr tablet Take 2 tablets by mouth 2 (Two) Times a Day. 360 tablet 1    multivitamin (THERAGRAN) tablet tablet Take 1 tablet by mouth Daily.      mupirocin (BACTROBAN) 2 % ointment Apply to affected area twice daily to open wounds. Use with hydrocortisone 22 g 2    pimecrolimus (ELIDEL) 1 % cream Use twice daily to areas of flare. 60 g 5    tacrolimus (PROTOPIC) 0.1 % ointment Apply twice daily to affected area 100 g 11    terazosin (HYTRIN) 5 MG capsule Take 1 capsule by mouth Every Evening. 90 capsule 3    thiamine (VITAMIN B1) 100 MG tablet Take 1 tablet by mouth.      torsemide (DEMADEX) 10 MG tablet Take 1 tablet by mouth Daily. 90 tablet 3    metoprolol succinate XL (TOPROL-XL) 50 MG 24 hr tablet 2 tablets 2 (Two) Times a Day.      olmesartan (BENICAR) 40 MG tablet Take 1 tablet by mouth Daily.      rosuvastatin (CRESTOR) 20 MG tablet Take 1 tablet by mouth Daily.      dapagliflozin Propanediol (Farxiga) 10 MG tablet Take 1 tablet by mouth Daily. (Patient not taking: Reported on 6/10/2024) 90 tablet 1    icosapent ethyl (VASCEPA) 1 g capsule capsule TAKE 2 CAPSULES BY MOUTH TWO TIMES  A DAY WITH MEALS (Patient not taking: Reported on 6/10/2024)      Insulin Degludec FlexTouch 200 UNIT/ML solution pen-injector Inject 18 Units under the skin into the appropriate area as directed Daily. (Patient not taking: Reported on 6/10/2024) 15 mL 3    tacrolimus (PROTOPIC) 0.1 % ointment Apply a thin layer to affected area twice daily. (Patient not taking: Reported on 6/10/2024) 100 g 11    tacrolimus (PROTOPIC) 0.1 % ointment Apply twice daily to affected area (Patient not taking: Reported on 6/10/2024) 100 g 11    terazosin (HYTRIN) 5 MG capsule Take 1 capsule by mouth Every Night. (Patient not taking: Reported on 6/10/2024)       No facility-administered medications prior to visit.       Opioid medication/s are on active medication list.  and I have evaluated his active treatment plan and pain score trends (see table).  Vitals:    06/10/24 0837   PainSc: 0-No pain     I have reviewed the chart for potential of high risk medication and harmful drug interactions in the elderly.          Aspirin is on active medication list. Aspirin use is indicated based on review of current medical condition/s. Pros and cons of this therapy have been discussed today. Benefits of this medication outweigh potential harm.  Patient has been encouraged to continue taking this medication.  .      Patient Active Problem List   Diagnosis    Age-related cataract of both eyes    Aortic stenosis, mild    Arthritis    CAD (coronary artery disease)    HTN (hypertension)    Hyperkalemia    Hyperlipidemia    Knee pain    Left carotid bruit    Loose body in elbow joint, right    Non-ST elevation MI (NSTEMI)    Obesity    Obstructive sleep apnea    Osteoarthritis of both knees    Proteinuria    S/P PTCA (percutaneous transluminal coronary angioplasty)    Smoking hx    Stage 3 chronic kidney disease    Tear of medial meniscus of knee    Type 2 diabetes mellitus with diabetic chronic kidney disease    Uncontrolled type 2 diabetes mellitus with  "hyperglycemia    Vitamin D deficiency    Gout    Abnormal TSH    Subclinical hypothyroidism    Hypothyroidism     Advance Care Planning   Advance Care Planning     Advance Directive is not on file.  ACP discussion was held with the patient during this visit. Patient does not have an advance directive, declines further assistance.     Objective    Vitals:    06/10/24 0837   BP: 132/80   Pulse: 75   Resp: 16   Temp: 96.9 °F (36.1 °C)   TempSrc: Temporal   SpO2: 97%   Weight: 129 kg (284 lb 6.4 oz)   Height: 188 cm (74.02\")   PainSc: 0-No pain     Estimated body mass index is 36.5 kg/m² as calculated from the following:    Height as of this encounter: 188 cm (74.02\").    Weight as of this encounter: 129 kg (284 lb 6.4 oz).    Class 2 Severe Obesity (BMI >=35 and <=39.9). Obesity-related health conditions include the following: hypertension, coronary heart disease, diabetes mellitus, dyslipidemias, and osteoarthritis. Obesity is unchanged. BMI is is above average; BMI management plan is completed. We discussed portion control and increasing exercise.      Does the patient have evidence of cognitive impairment? No    Recent Results (from the past 336 hour(s))   CBC & Differential    Collection Time: 06/05/24  8:53 AM    Specimen: Blood   Result Value Ref Range    WBC 8.0 3.4 - 10.8 x10E3/uL    RBC 4.74 4.14 - 5.80 x10E6/uL    Hemoglobin 14.2 13.0 - 17.7 g/dL    Hematocrit 43.3 37.5 - 51.0 %    MCV 91 79 - 97 fL    MCH 30.0 26.6 - 33.0 pg    MCHC 32.8 31.5 - 35.7 g/dL    RDW 13.3 11.6 - 15.4 %    Platelets 183 150 - 450 x10E3/uL    Neutrophil Rel % 47 Not Estab. %    Lymphocyte Rel % 41 Not Estab. %    Monocyte Rel % 8 Not Estab. %    Eosinophil Rel % 4 Not Estab. %    Basophil Rel % 0 Not Estab. %    Neutrophils Absolute 3.8 1.4 - 7.0 x10E3/uL    Lymphocytes Absolute 3.3 (H) 0.7 - 3.1 x10E3/uL    Monocytes Absolute 0.6 0.1 - 0.9 x10E3/uL    Eosinophils Absolute 0.3 0.0 - 0.4 x10E3/uL    Basophils Absolute 0.0 0.0 - 0.2 " x10E3/uL    Immature Granulocyte Rel % 0 Not Estab. %    Immature Grans Absolute 0.0 0.0 - 0.1 x10E3/uL   Comprehensive Metabolic Panel    Collection Time: 06/05/24  8:53 AM    Specimen: Blood   Result Value Ref Range    Glucose 203 (H) 70 - 99 mg/dL    BUN 22 8 - 27 mg/dL    Creatinine 1.05 0.76 - 1.27 mg/dL    EGFR Result 78 >59 mL/min/1.73    BUN/Creatinine Ratio 21 10 - 24    Sodium 139 134 - 144 mmol/L    Potassium 4.4 3.5 - 5.2 mmol/L    Chloride 104 96 - 106 mmol/L    Total CO2 20 20 - 29 mmol/L    Calcium 10.2 8.6 - 10.2 mg/dL    Total Protein 6.9 6.0 - 8.5 g/dL    Albumin 4.3 3.9 - 4.9 g/dL    Globulin 2.6 1.5 - 4.5 g/dL    A/G Ratio 1.7 1.2 - 2.2    Total Bilirubin 0.7 0.0 - 1.2 mg/dL    Alkaline Phosphatase 70 44 - 121 IU/L    AST (SGOT) 28 0 - 40 IU/L    ALT (SGPT) 32 0 - 44 IU/L   Lipid Panel With LDL / HDL Ratio    Collection Time: 06/05/24  8:53 AM    Specimen: Blood   Result Value Ref Range    Total Cholesterol 137 100 - 199 mg/dL    Triglycerides 346 (H) 0 - 149 mg/dL    HDL Cholesterol 44 >39 mg/dL    VLDL Cholesterol Charles 51 (H) 5 - 40 mg/dL    LDL Chol Calc (NIH) 42 0 - 99 mg/dL    LDL/HDL RATIO 1.0 0.0 - 3.6 ratio   Hemoglobin A1c    Collection Time: 06/05/24  8:53 AM    Specimen: Blood   Result Value Ref Range    Hemoglobin A1C 8.6 (H) 4.8 - 5.6 %   Microalbumin / Creatinine Urine Ratio - Urine, Clean Catch    Collection Time: 06/05/24  8:53 AM    Specimen: Urine, Clean Catch   Result Value Ref Range    Creatinine, Urine 33.4 Not Estab. mg/dL    Microalbumin, Urine 53.5 Not Estab. ug/mL    Microalbumin/Creatinine Ratio 160 (H) 0 - 29 mg/g creat   TSH Rfx On Abnormal To Free T4    Collection Time: 06/05/24  8:53 AM    Specimen: Blood   Result Value Ref Range    TSH 4.490 0.450 - 4.500 uIU/mL         HEALTH RISK ASSESSMENT    Smoking Status:  Social History     Tobacco Use   Smoking Status Former    Current packs/day: 0.00    Average packs/day: 1.5 packs/day for 41.0 years (61.5 ttl pk-yrs)     Types: Cigarettes    Start date: 1971    Quit date: 2012    Years since quittin.4    Passive exposure: Past   Smokeless Tobacco Never   Tobacco Comments    Quit in 2012; 1+PPD     Alcohol Consumption:  Social History     Substance and Sexual Activity   Alcohol Use Not Currently    Comment: rarely     Fall Risk Screen:    OMEGA Fall Risk Assessment was completed, and patient is at MODERATE risk for falls. Assessment completed on:6/10/2024    Depression Screenin/10/2024     8:36 AM   PHQ-2/PHQ-9 Depression Screening   Little Interest or Pleasure in Doing Things 0-->not at all   Feeling Down, Depressed or Hopeless 0-->not at all   PHQ-9: Brief Depression Severity Measure Score 0       Health Habits and Functional and Cognitive Screenin/10/2024     8:35 AM   Functional & Cognitive Status   Do you have difficulty preparing food and eating? No   Do you have difficulty bathing yourself, getting dressed or grooming yourself? No   Do you have difficulty using the toilet? No   Do you have difficulty moving around from place to place? No   Do you have trouble with steps or getting out of a bed or a chair? No   Current Diet Well Balanced Diet   Dental Exam Up to date   Eye Exam Up to date   Exercise (times per week) 0 times per week   Current Exercises Include No Regular Exercise   Do you need help using the phone?  No   Are you deaf or do you have serious difficulty hearing?  No   Do you need help to go to places out of walking distance? No   Do you need help shopping? No   Do you need help preparing meals?  No   Do you need help with housework?  No   Do you need help with laundry? No   Do you need help taking your medications? No   Do you need help managing money? No   Do you ever drive or ride in a car without wearing a seat belt? No   Have you felt unusual stress, anger or loneliness in the last month? No   Who do you live with? Spouse   If you need help, do you have trouble finding someone  available to you? No   Have you been bothered in the last four weeks by sexual problems? No   Do you have difficulty concentrating, remembering or making decisions? No       Age-appropriate Screening Schedule:  Refer to the list below for future screening recommendations based on patient's age, sex and/or medical conditions. Orders for these recommended tests are listed in the plan section. The patient has been provided with a written plan.    Health Maintenance   Topic Date Due    DIABETIC EYE EXAM  Never done    ZOSTER VACCINE (2 of 3) 11/15/2017    BMI FOLLOWUP  05/25/2024    LUNG CANCER SCREENING  08/04/2024    COVID-19 Vaccine (4 - 2023-24 season) 08/30/2024 (Originally 9/1/2023)    RSV Vaccine - Adults (1 - 1-dose 60+ series) 06/10/2025 (Originally 9/20/2017)    INFLUENZA VACCINE  08/01/2024    HEMOGLOBIN A1C  12/05/2024    DIABETIC FOOT EXAM  12/08/2024    COLORECTAL CANCER SCREENING  04/09/2025    LIPID PANEL  06/05/2025    URINE MICROALBUMIN  06/05/2025    ANNUAL WELLNESS VISIT  06/10/2025    Pneumococcal Vaccine 65+ (3 of 3 - PPSV23 or PCV20) 01/28/2027    TDAP/TD VACCINES (2 - Td or Tdap) 09/20/2029    HEPATITIS C SCREENING  Completed    AAA SCREEN (ONE-TIME)  Completed                  CMS Preventative Services Quick Reference  Risk Factors Identified During Encounter  Immunizations Discussed/Encouraged: Shingrix  Dental Screening Recommended  Vision Screening Recommended  The above risks/problems have been discussed with the patient.  Pertinent information has been shared with the patient in the After Visit Summary.  An After Visit Summary and PPPS were made available to the patient.    Follow Up:   Next Medicare Wellness visit to be scheduled in 1 year.       Additional E&M Note during same encounter follows:  Patient has multiple medical problems which are significant and separately identifiable that require additional work above and beyond the Medicare Wellness Visit.      Chief Complaint  Medicare  "Wellness-subsequent, Diabetes, and Hypertension    Subjective        HPI  Amrik Trimble is also being seen today for hypertension, hyperlipidemia, diabetes and MICHAEL with cpap use. Patient stating he is using his CPAP nightly.     Review of Systems   Constitutional: Negative.    Respiratory: Negative.     Cardiovascular: Negative.    Gastrointestinal: Negative.    Musculoskeletal:  Positive for arthralgias.   Neurological: Negative.    Psychiatric/Behavioral: Negative.         Objective   Vital Signs:  /80   Pulse 75   Temp 96.9 °F (36.1 °C) (Temporal)   Resp 16   Ht 188 cm (74.02\")   Wt 129 kg (284 lb 6.4 oz)   SpO2 97%   BMI 36.50 kg/m²     Physical Exam  Vitals reviewed.   HENT:      Head: Normocephalic.   Eyes:      Pupils: Pupils are equal, round, and reactive to light.   Neck:      Vascular: No carotid bruit.   Cardiovascular:      Rate and Rhythm: Normal rate and regular rhythm.      Pulses: Normal pulses.   Pulmonary:      Effort: Pulmonary effort is normal.      Breath sounds: Normal breath sounds.   Musculoskeletal:         General: Normal range of motion.      Right lower leg: No edema.      Left lower leg: No edema.   Skin:     General: Skin is warm and dry.   Neurological:      Mental Status: He is alert and oriented to person, place, and time.   Psychiatric:         Behavior: Behavior normal.                         Assessment and Plan   Diagnoses and all orders for this visit:    1. Medicare annual wellness visit, subsequent (Primary)    2. Primary hypertension  -     olmesartan (BENICAR) 40 MG tablet; Take 1 tablet by mouth Daily.  Dispense: 90 tablet; Refill: 1  -     metoprolol succinate XL (TOPROL-XL) 50 MG 24 hr tablet; Take 1 tablet by mouth 2 (Two) Times a Day.  Dispense: 180 tablet; Refill: 1    3. Mixed hyperlipidemia  -     rosuvastatin (CRESTOR) 20 MG tablet; Take 1 tablet by mouth Daily.  Dispense: 90 tablet; Refill: 1    4. Uncontrolled type 2 diabetes mellitus with " hyperglycemia    Hypertension: stable, continue clonidine 0.1 mg 2 tabs three times a day, eplerenone 50 mg 1 tab daily, Toprol XL 50 mg 1 tab twice a day, and olmesartan 40 mg 1 Tab daily. Continue to follow up with Dr. Lee as directed/as needed     Hyperlipidemia: LDL stable at 42, triglycerides elevated at 346 and VLDL elevated at 51. Recommend to continue to reduce sugary foods and drinks, reduce white bread/white rice/white potatoes/pasta, reduce starchy veges (potatoes, carrots and corn). Recheck labs in 6 months.     Diabetes: uncontrolled, continue to follow up with endocrinology KAI Constantino         Follow Up   No follow-ups on file.  Patient was given instructions and counseling regarding his condition or for health maintenance advice. Please see specific information pulled into the AVS if appropriate.

## 2024-07-03 ENCOUNTER — APPOINTMENT (OUTPATIENT)
Dept: CT IMAGING | Facility: HOSPITAL | Age: 69
End: 2024-07-03
Payer: MEDICARE

## 2024-07-03 ENCOUNTER — HOSPITAL ENCOUNTER (EMERGENCY)
Facility: HOSPITAL | Age: 69
Discharge: HOME | End: 2024-07-03
Attending: EMERGENCY MEDICINE
Payer: MEDICARE

## 2024-07-03 VITALS
BODY MASS INDEX: 35.94 KG/M2 | HEIGHT: 74 IN | TEMPERATURE: 98.1 F | OXYGEN SATURATION: 95 % | SYSTOLIC BLOOD PRESSURE: 130 MMHG | HEART RATE: 85 BPM | RESPIRATION RATE: 16 BRPM | WEIGHT: 280 LBS | DIASTOLIC BLOOD PRESSURE: 53 MMHG

## 2024-07-03 DIAGNOSIS — N20.1 RIGHT URETERAL CALCULUS: Primary | ICD-10-CM

## 2024-07-03 DIAGNOSIS — N20.1 URETERAL STONE: ICD-10-CM

## 2024-07-03 LAB
BACTERIA UR QL AUTO: NORMAL /HPF
BILIRUB UR QL STRIP: NEGATIVE
CLARITY UR: CLEAR
COLOR UR: YELLOW
GLUCOSE UR STRIP-MCNC: ABNORMAL MG/DL
HGB UR QL STRIP.AUTO: ABNORMAL
HOLD SPECIMEN: NORMAL
HYALINE CASTS UR QL AUTO: NORMAL /LPF
KETONES UR QL STRIP: NEGATIVE
LEUKOCYTE ESTERASE UR QL STRIP.AUTO: NEGATIVE
NITRITE UR QL STRIP: NEGATIVE
PH UR STRIP.AUTO: <=5 [PH] (ref 5–8)
PROT UR QL STRIP: NEGATIVE
RBC # UR STRIP: NORMAL /HPF
REF LAB TEST METHOD: NORMAL
SP GR UR STRIP: <=1.005 (ref 1–1.03)
SQUAMOUS #/AREA URNS HPF: NORMAL /HPF
UROBILINOGEN UR QL STRIP: ABNORMAL
WBC # UR STRIP: NORMAL /HPF

## 2024-07-03 PROCEDURE — 99284 EMERGENCY DEPT VISIT MOD MDM: CPT | Performed by: EMERGENCY MEDICINE

## 2024-07-03 PROCEDURE — 81001 URINALYSIS AUTO W/SCOPE: CPT | Performed by: EMERGENCY MEDICINE

## 2024-07-03 PROCEDURE — 74176 CT ABD & PELVIS W/O CONTRAST: CPT

## 2024-07-03 PROCEDURE — 99284 EMERGENCY DEPT VISIT MOD MDM: CPT

## 2024-07-03 RX ORDER — HYDROCODONE BITARTRATE AND ACETAMINOPHEN 5; 325 MG/1; MG/1
1 TABLET ORAL ONCE AS NEEDED
Status: DISCONTINUED | OUTPATIENT
Start: 2024-07-03 | End: 2024-07-03 | Stop reason: HOSPADM

## 2024-07-03 RX ORDER — HYDROCODONE BITARTRATE AND ACETAMINOPHEN 5; 325 MG/1; MG/1
1 TABLET ORAL EVERY 6 HOURS PRN
Qty: 10 TABLET | Refills: 0 | Status: SHIPPED | OUTPATIENT
Start: 2024-07-03

## 2024-07-03 RX ORDER — NALOXONE HYDROCHLORIDE 4 MG/.1ML
SPRAY NASAL
Qty: 2 EACH | Refills: 0 | Status: SHIPPED | OUTPATIENT
Start: 2024-07-03

## 2024-07-03 RX ADMIN — HYDROCODONE BITARTRATE AND ACETAMINOPHEN 1 TABLET: 5; 325 TABLET ORAL at 14:27

## 2024-07-03 NOTE — DISCHARGE INSTRUCTIONS
You have 2 tiny stones at the distal ureter near the bladder that are 1 to 2 mm in size which are very small and very passable.  He is unlikely to give you the numbness in your right thigh which could still be related to a back issue but if the worst your pain is in the back the radiation from that pain into your back from the stones can give you the pain.  You are given pain medicine here and I sent a prescription to your pharmacy for more pain medicine.  Please strain your urine since you know that you have passed the stones.  If anything changes such as numbness or tingling in your groin or difficulty with bowel or bladder please return to the ER immediately.  Otherwise follow-up with your physician next week as I suspect everybody will be closed on Friday due to the holiday weekend and 4 July.

## 2024-07-03 NOTE — FSED PROVIDER NOTE
Subjective   History of Present Illness  67 yo man with a PMH of low back pain and one kidney stone years ago on left side has had 3-4 days if right lower back pain that radiates to anterior right thigh and sometimes to right lower extremity with tingling sometimes in right anterior thigh, but not perineal. No difficulty controlling bowel or bladder, no numbness, tingling or pain in perineum or rectum. Able to walk and no weakness of RLE. Lidocaine patch helped but unable to keep it on due to hair. Took one tablet of a narcotic last night and had a couple of hours of relief. Has difficulty moving due to pain and sleeping due to pain. No known fever, rash or trauma.            Review of Systems    Past Medical History:   Diagnosis Date    Age-related cataract of both eyes 12/27/2017    Ankle sprain     Aortic stenosis, mild 03/08/2021    Arthritis 07/08/2020    Formatting of this note might be different from the original. Added automatically from request for surgery [id number]    CAD (coronary artery disease) 05/10/2013    CTS (carpal tunnel syndrome)     Gout 04/24/2023    HTN (hypertension) 05/10/2013    Hyperkalemia 11/18/2021    Hyperlipidemia 05/10/2013    Knee sprain     Left carotid bruit 03/08/2021    Low back strain     Non-ST elevation MI (NSTEMI) 02/01/2012    Obesity 07/30/2014    Obstructive sleep apnea 04/24/2023    Formatting of this note might be different from the original. compliant    Osteoarthritis of both knees 02/10/2022    Periarthritis of shoulder     Proteinuria 02/15/2016    S/P PTCA (percutaneous transluminal coronary angioplasty) 07/30/2014    Smoking hx 05/10/2013    Stage 3 chronic kidney disease 11/18/2021    Tear of medial meniscus of knee 12/22/2015    Formatting of this note might be different from the original. Added automatically from request for surgery [id number]    Tear of meniscus of knee     Type 2 diabetes mellitus with diabetic chronic kidney disease 11/18/2021    Uncontrolled type  2 diabetes mellitus with hyperglycemia 2015    Vitamin D deficiency 2014    Wrist sprain        No Known Allergies    Past Surgical History:   Procedure Laterality Date    CORONARY ANGIOPLASTY      with stents    ELBOW ARTHROPLASTY Right     ELBOW PROCEDURE      HAND SURGERY Left     INGUINAL HERNIA REPAIR Bilateral     KIDNEY STONE SURGERY      KNEE SURGERY      MENISCECTOMY Bilateral     ROTATOR CUFF REPAIR Left     ROTATOR CUFF REPAIR Right     x 2    SHOULDER SURGERY      THUMB ARTHROSCOPY Left     re-attachement    WRIST SURGERY         Family History   Problem Relation Age of Onset    Emphysema Mother     No Known Problems Father     No Known Problems Sister     No Known Problems Brother     Diabetes Maternal Grandmother     Arthritis Maternal Grandfather     Kidney disease Maternal Grandfather     Glaucoma Paternal Grandmother     No Known Problems Paternal Grandfather        Social History     Socioeconomic History    Marital status:    Tobacco Use    Smoking status: Former     Current packs/day: 0.00     Average packs/day: 1.5 packs/day for 41.0 years (61.5 ttl pk-yrs)     Types: Cigarettes     Start date: 1971     Quit date: 2012     Years since quittin.5     Passive exposure: Past    Smokeless tobacco: Never    Tobacco comments:     Quit in ; 1+PPD   Vaping Use    Vaping status: Never Used   Substance and Sexual Activity    Alcohol use: Not Currently     Comment: rarely    Drug use: Never    Sexual activity: Yes     Partners: Female     Birth control/protection: Pill           Objective   Physical Exam  Vitals and nursing note reviewed.   Constitutional:       Appearance: Normal appearance. He is obese.   HENT:      Mouth/Throat:      Mouth: Mucous membranes are moist.      Pharynx: Oropharynx is clear.   Eyes:      Extraocular Movements: Extraocular movements intact.   Cardiovascular:      Rate and Rhythm: Normal rate.   Pulmonary:      Effort: Pulmonary effort is  normal.   Musculoskeletal:         General: No swelling, tenderness, deformity or signs of injury. Normal range of motion.      Cervical back: Normal range of motion and neck supple.      Right lower leg: No edema.      Left lower leg: No edema.      Comments: No CTA right tenderness. Notes that palpating lower back right lateral lumbar spine is where the pain begins and radiates to right hip and then anterior thigh, no stepoffs, swelling or redness of spine, NT spine, no rash or evidence of trauma to lower back or right lateral back. FROM with pain of hip knee leg, no weakness or numbness at the time of exam   Skin:     General: Skin is warm and dry.      Capillary Refill: Capillary refill takes less than 2 seconds.   Neurological:      General: No focal deficit present.      Mental Status: He is alert and oriented to person, place, and time. Mental status is at baseline.   Psychiatric:         Mood and Affect: Mood normal.         Behavior: Behavior normal.         Thought Content: Thought content normal.         Procedures           ED Course  ED Course as of 07/03/24 1613   Wed Jul 03, 2024   1420 Urine shows trace blood and spilling sugar. [AR]   1430 Patient has a nephrologist and has never had blood in his urine except when he is at a stone so he is agreed to a CT without to rule out stone. [AR]   1505 Nitrite, UA: Negative [EW]   1505 Leukocytes, UA: Negative [EW]   1604 Pertinent CT results re to the pain per rads MD  FINDINGS:  1. There are 2 punctate, 1-2 mm, calcific densities within the distal  right ureter, without hydronephrosis, but there is mild right  perinephric stranding. There is a single 4 mm nonobstructing stone  within the right kidney. There are no left renal stones. There are  multiple renal vascular calcifications bilaterally.      [AR]      ED Course User Index  [AR] Annemarie Tinajero MD  [EW] Samantha Fair APRN                                           Medical Decision  Making  Differential diagnosis includes but not limited to small renal stones versus sciatica related to pinched nerve.  Patient has blood in his urine so was getting a CT to rule out ureteral stones.    You have 2 tiny stones at the distal ureter near the bladder that are 1 to 2 mm in size which are very small and very passable.  He is unlikely to give you the numbness in your right thigh which could still be related to a back issue but if the worst your pain is in the back the radiation from that pain into your back from the stones can give you the pain.  You are given pain medicine here and I sent a prescription to your pharmacy for more pain medicine.  Please strain your urine since you know that you have passed the stones.  If anything changes such as numbness or tingling in your groin or difficulty with bowel or bladder please return to the ER immediately.  Otherwise follow-up with your physician next week as I suspect everybody will be closed on Friday due to the holiday weekend and 4 July.    He and his wife understood and agreed    Amount and/or Complexity of Data Reviewed  Labs: ordered. Decision-making details documented in ED Course.  Radiology: ordered.    Risk  Prescription drug management.        Final diagnoses:   Ureteral stone   Right ureteral calculus       ED Disposition  ED Disposition       ED Disposition   Discharge    Condition   Stable    Comment   --               Rupinder Link, APRN  [id number] [id number] [phone]      Within 5 to 7 days.         Medication List        New Prescriptions      naloxone 4 MG/0.1ML nasal spray  Commonly known as: NARCAN  Call 911. Don't prime. [place] in 1 nostril for overdose. Repeat in 2-3 minutes in other nostril if no or minimal breathing/responsiveness.            Changed      * HYDROcodone-acetaminophen 7.5-325 MG per tablet  Commonly known as: NORCO  What changed: Another medication with the same name was added. Make sure you  understand how and when to take each.     * HYDROcodone-acetaminophen 5-325 MG per tablet  Commonly known as: NORCO  Take 1 tablet by mouth Every 6 (Six) Hours As Needed for Moderate Pain for up to 10 doses.  What changed: You were already taking a medication with the same name, and this prescription was added. Make sure you understand how and when to take each.           * This list has 2 medication(s) that are the same as other medications prescribed for you. Read the directions carefully, and ask your doctor or other care provider to review them with you.                   Where to Get Your Medications        These medications were sent to [facility] [place] [id number]      Hours: Monday to Friday 7 AM to 6 PM, Saturday & Sunday 8 AM to 4:30 PM (Closed 12 PM to 12:30 PM) Phone: [phone]   HYDROcodone-acetaminophen 5-325 MG per tablet  naloxone 4 MG/0.1ML nasal spray

## 2024-07-08 ENCOUNTER — OFFICE VISIT (OUTPATIENT)
Age: 69
End: 2024-07-08
Payer: COMMERCIAL

## 2024-07-08 VITALS
SYSTOLIC BLOOD PRESSURE: 118 MMHG | BODY MASS INDEX: 36.06 KG/M2 | HEIGHT: 74 IN | OXYGEN SATURATION: 98 % | HEART RATE: 88 BPM | WEIGHT: 281 LBS | DIASTOLIC BLOOD PRESSURE: 80 MMHG

## 2024-07-08 DIAGNOSIS — I25.10 CORONARY ARTERY DISEASE INVOLVING NATIVE CORONARY ARTERY OF NATIVE HEART WITHOUT ANGINA PECTORIS: Primary | ICD-10-CM

## 2024-07-08 PROCEDURE — 99214 OFFICE O/P EST MOD 30 MIN: CPT | Performed by: INTERNAL MEDICINE

## 2024-07-08 PROCEDURE — 93000 ELECTROCARDIOGRAM COMPLETE: CPT | Performed by: INTERNAL MEDICINE

## 2024-07-08 NOTE — PROGRESS NOTES
Subjective:     Encounter Date:07/06/2023      Patient ID: Amrik Trimble is a 68 y.o. male.    Chief Complaint:  CAD AS  HPI:   67 year old man with a known history of CAD status post 2 vessel PCI in 2012. He also has known mild AS based on echo in 2022, performed at [facility].   He is doing well.  His A1c remains uncontrolled.  He is dealing with a lot as his 10-year-old grandson was recently diagnosed with T-cell lymphoma.  He is obviously emotional about this.  His blood pressure is well-controlled.  Triglycerides are elevated however otherwise lipids are fine.  Does not do any regular exercise.  Blood pressure well-controlled.  He does plan to have his hip replaced later this year    Former smoker, quit at time of MI.     The following portions of the patient's history were reviewed and updated as appropriate: allergies, current medications, past family history, past medical history, past social history, past surgical history and problem list.     REVIEW OF SYSTEMS:   All systems reviewed.  Pertinent positives identified in HPI.  All other systems are negative.    Past Medical History:   Diagnosis Date    Age-related cataract of both eyes 12/27/2017    Ankle sprain     Aortic stenosis, mild 03/08/2021    Arthritis 07/08/2020    Formatting of this note might be different from the original. Added automatically from request for surgery [id number]    CAD (coronary artery disease) 05/10/2013    CTS (carpal tunnel syndrome)     Gout 04/24/2023    HTN (hypertension) 05/10/2013    Hyperkalemia 11/18/2021    Hyperlipidemia 05/10/2013    Knee sprain     Left carotid bruit 03/08/2021    Low back strain     Non-ST elevation MI (NSTEMI) 02/01/2012    Obesity 07/30/2014    Obstructive sleep apnea 04/24/2023    Formatting of this note might be different from the original. compliant    Osteoarthritis of both knees 02/10/2022    Periarthritis of shoulder     Proteinuria 02/15/2016    S/P PTCA (percutaneous transluminal coronary  angioplasty) 2014    Smoking hx 05/10/2013    Stage 3 chronic kidney disease 2021    Tear of medial meniscus of knee 2015    Formatting of this note might be different from the original. Added automatically from request for surgery [id number]    Tear of meniscus of knee     Type 2 diabetes mellitus with diabetic chronic kidney disease 2021    Uncontrolled type 2 diabetes mellitus with hyperglycemia 2015    Vitamin D deficiency 2014    Wrist sprain        Family History   Problem Relation Age of Onset    Emphysema Mother     No Known Problems Father     No Known Problems Sister     No Known Problems Brother     Diabetes Maternal Grandmother     Arthritis Maternal Grandfather     Kidney disease Maternal Grandfather     Glaucoma Paternal Grandmother     No Known Problems Paternal Grandfather        Social History     Socioeconomic History    Marital status:    Tobacco Use    Smoking status: Former     Current packs/day: 0.00     Average packs/day: 1.5 packs/day for 41.0 years (61.5 ttl pk-yrs)     Types: Cigarettes     Start date: 1971     Quit date: 2012     Years since quittin.5     Passive exposure: Past    Smokeless tobacco: Never    Tobacco comments:     Quit in ; 1+PPD   Vaping Use    Vaping status: Never Used   Substance and Sexual Activity    Alcohol use: Not Currently     Comment: rarely    Drug use: Never    Sexual activity: Yes     Partners: Female     Birth control/protection: Pill       No Known Allergies    Past Surgical History:   Procedure Laterality Date    CORONARY ANGIOPLASTY      with stents    ELBOW ARTHROPLASTY Right     ELBOW PROCEDURE      HAND SURGERY Left     INGUINAL HERNIA REPAIR Bilateral     KIDNEY STONE SURGERY      KNEE SURGERY      MENISCECTOMY Bilateral     ROTATOR CUFF REPAIR Left     ROTATOR CUFF REPAIR Right     x 2    SHOULDER SURGERY      THUMB ARTHROSCOPY Left     re-attachement    WRIST SURGERY           ECG 12  Lead    Date/Time: 7/8/2024 11:03 AM  Performed by: Mehreen Lee MD    Authorized by: Mehreen Lee MD  Comparison: compared with previous ECG from 7/6/2023  Rhythm: sinus rhythm  Rate: normal  Conduction: conduction normal  ST Segments: ST segments normal  T Waves: T waves normal  QRS axis: normal  Other: no other findings    Clinical impression: normal ECG           Objective:         PHYSICAL EXAM:  GEN: VSS, no distress,   Eyes: normal sclera, normal lids and lashes  HENT: moist mucus membranes,   Respiratory: CTAB, no rales or wheezes  CV: RRR, 2/6 systolic murmur radiates to carotids and apex , +2 DP and 2+ carotid pulses b/l  GI: NABS, soft,  Nontender, nondistended  MSK: no edema, no scoliosis or kyphosis  Skin: no rash, warm, dry  Heme/Lymph: no bruising or bleeding  Psych: organized thought, normal behavior and affect  Neuro: Cranial nerves grossly intact, Alert and Oriented x 3.         Assessment:         No diagnosis found.         Plan:       CAD: remote 2 vessel PCI 2012. No angina. ASA.  In view of upcoming hip surgery will get a nuclear stress test.  If this is normal he would be cleared for surgery.  HTN: Controlled on clonidiine, eplerenone, toprol  Mild AS: repeat echo 3-5 years or with symptoms.   CKD  DM poorly controlled    Rupinder, thank you very much for referring this kind patient to me. Please call me with any questions or concerns. I will see the patient again in the office in 1 year.          Mehreen Lee MD  07/08/24  [facility]    Outpatient Encounter Medications as of 7/8/2024   Medication Sig Dispense Refill    Accu-Chek Guide test strip       ACETAMINOPHEN PO Take 1 tablet by mouth.      allopurinol (ZYLOPRIM) 100 MG tablet Take 1 tablet (100 mg total) by mouth 1 (one) time each day 90 tablet 3    aspirin 81 MG EC tablet Take 1 tablet by mouth.      Cinnamon 500 MG capsule Take 2 capsules by mouth.      cloNIDine (CATAPRES) 0.1 MG tablet Take 2 tablets by mouth  3 (Three) Times a Day.      clotrimazole-betamethasone (LOTRISONE) 1-0.05 % cream Apply a thin layer to affected ears once daily for 5 days in a row, then for reoccurrence, use once daily for 1-2 days 45 g 1    Coenzyme Q10 200 MG capsule Take 1 tablet by mouth Daily.      dapagliflozin Propanediol (Farxiga) 10 MG tablet Take 1 tablet by mouth Daily. 90 tablet 1    Easy Touch Lancets 30G/Twist misc       eplerenone (INSPRA) 50 MG tablet Take 1 tablet by mouth Daily.      glimepiride (AMARYL) 4 MG tablet Take 1 tablet by mouth 2 (Two) Times a Day With Meals. 90 tablet 1    HYDROcodone-acetaminophen (NORCO) 5-325 MG per tablet Take 1 tablet by mouth Every 6 (Six) Hours As Needed for Moderate Pain for up to 10 doses. 10 tablet 0    hydrocortisone 2.5 % ointment Apply a thin layer to affected areas by topical route twice daily. Safe to use on face. 454 g 11    icosapent ethyl (VASCEPA) 1 g capsule capsule Take 1 g by mouth 2 (Two) Times a Day With Meals. 180 capsule 1    Insulin Degludec FlexTouch 200 UNIT/ML solution pen-injector Inject 24 Units under the skin into the appropriate area as directed Daily. 9 mL 5    Insulin Pen Needle 32G X 4 MM misc Use 1 each up to 4 (four) times daily. 200 each 5    ketotifen ([employer] Allergy Eye Drops) 0.025 % ophthalmic solution Location: Both Eyes. Instill one drop in each affected eye as needed. 10 mL 11    levothyroxine (SYNTHROID, LEVOTHROID) 50 MCG tablet Take 1 tablet by mouth Daily. 90 tablet 1    metFORMIN ER (GLUCOPHAGE-XR) 500 MG 24 hr tablet Take 2 tablets by mouth 2 (Two) Times a Day. 360 tablet 1    metoprolol succinate XL (Toprol XL) 50 MG 24 hr tablet Take 1 tablet by mouth 2 (Two) Times a Day. 180 tablet 1    metoprolol succinate XL (TOPROL-XL) 50 MG 24 hr tablet Take 1 tablet by mouth 2 (Two) Times a Day. 180 tablet 1    multivitamin (THERAGRAN) tablet tablet Take 1 tablet by mouth Daily.      mupirocin (BACTROBAN) 2 % ointment Apply to affected area twice daily to open  wounds. Use with hydrocortisone 22 g 2    naloxone (NARCAN) 4 MG/0.1ML nasal spray Call 911. Don't prime. [place] in 1 nostril for overdose. Repeat in 2-3 minutes in other nostril if no or minimal breathing/responsiveness. 2 each 0    olmesartan (BENICAR) 40 MG tablet Take 1 tablet by mouth Daily. 90 tablet 1    rosuvastatin (CRESTOR) 20 MG tablet Take 1 tablet by mouth Daily. 90 tablet 1    terazosin (HYTRIN) 5 MG capsule Take 1 capsule by mouth Every Evening. 90 capsule 3    thiamine (VITAMIN B1) 100 MG tablet Take 1 tablet by mouth.      torsemide (DEMADEX) 10 MG tablet Take 1 tablet by mouth Daily. 90 tablet 3    Cholecalciferol 50 MCG (2000 UT) capsule Take 1 capsule by mouth Daily.      HYDROcodone-acetaminophen (NORCO) 7.5-325 MG per tablet Take 1 tablet by mouth. (Patient not taking: Reported on 7/8/2024)      olmesartan (Benicar) 40 MG tablet Take 1 tablet by mouth Daily. 90 tablet 1    pimecrolimus (ELIDEL) 1 % cream Use twice daily to areas of flare. 60 g 5    rosuvastatin (Crestor) 20 MG tablet Take 1 tablet by mouth Daily. 90 tablet 1    tacrolimus (PROTOPIC) 0.1 % ointment Apply twice daily to affected area 100 g 11    [DISCONTINUED] dapagliflozin Propanediol (Farxiga) 10 MG tablet Take 1 tablet by mouth Daily. (Patient not taking: Reported on 6/10/2024) 90 tablet 1    [DISCONTINUED] icosapent ethyl (VASCEPA) 1 g capsule capsule TAKE 2 CAPSULES BY MOUTH TWO TIMES A DAY WITH MEALS (Patient not taking: Reported on 6/10/2024)      [DISCONTINUED] Insulin Degludec FlexTouch 200 UNIT/ML solution pen-injector Inject 18 Units under the skin into the appropriate area as directed Daily. (Patient not taking: Reported on 6/10/2024) 15 mL 3    [DISCONTINUED] metoprolol succinate XL (TOPROL-XL) 50 MG 24 hr tablet 2 tablets 2 (Two) Times a Day.      [DISCONTINUED] olmesartan (BENICAR) 40 MG tablet Take 1 tablet by mouth Daily.      [DISCONTINUED] rosuvastatin (CRESTOR) 20 MG tablet Take 1 tablet by mouth Daily.       [DISCONTINUED] tacrolimus (PROTOPIC) 0.1 % ointment Apply a thin layer to affected area twice daily. (Patient not taking: Reported on 6/10/2024) 100 g 11    [DISCONTINUED] tacrolimus (PROTOPIC) 0.1 % ointment Apply twice daily to affected area (Patient not taking: Reported on 6/10/2024) 100 g 11    [DISCONTINUED] terazosin (HYTRIN) 5 MG capsule Take 1 capsule by mouth Every Night. (Patient not taking: Reported on 6/10/2024)       No facility-administered encounter medications on file as of 7/8/2024.

## 2024-11-25 RX ORDER — METOPROLOL SUCCINATE 50 MG/1
50 TABLET, EXTENDED RELEASE ORAL 2 TIMES DAILY
Qty: 180 TABLET | Refills: 1 | Status: SHIPPED | OUTPATIENT
Start: 2024-11-25

## 2024-11-25 RX ORDER — ROSUVASTATIN CALCIUM 20 MG/1
20 TABLET, COATED ORAL DAILY
Qty: 90 TABLET | Refills: 1 | Status: SHIPPED | OUTPATIENT
Start: 2024-11-25 | End: 2024-11-25

## 2024-11-25 RX ORDER — OLMESARTAN MEDOXOMIL 40 MG/1
40 TABLET ORAL DAILY
Qty: 90 TABLET | Refills: 1 | Status: SHIPPED | OUTPATIENT
Start: 2024-11-25

## 2024-11-25 RX ORDER — ROSUVASTATIN CALCIUM 40 MG/1
40 TABLET, COATED ORAL NIGHTLY
Qty: 90 TABLET | Refills: 3 | Status: SHIPPED | OUTPATIENT
Start: 2024-11-25

## 2024-11-25 NOTE — TELEPHONE ENCOUNTER
Rx Refill Note  Requested Prescriptions     Signed Prescriptions Disp Refills    rosuvastatin (CRESTOR) 40 MG tablet 90 tablet 3     Sig: Take 1 tablet by mouth Every Night.     Authorizing Provider: SULMA GOTTI     Ordering User: MIRELA EVERETT      Last office visit with prescribing clinician: 6/10/2024   Last telemedicine visit with prescribing clinician: Visit date not found   Next office visit with prescribing clinician: 12/30/2024                         Would you like a call back once the refill request has been completed: [] Yes [] No    If the office needs to give you a call back, can they leave a voicemail: [] Yes [] No    Mirela Chavarria MA  11/25/24, 11:03 EST

## 2024-12-23 DIAGNOSIS — E55.9 VITAMIN D DEFICIENCY: ICD-10-CM

## 2024-12-23 DIAGNOSIS — E11.65 UNCONTROLLED TYPE 2 DIABETES MELLITUS WITH HYPERGLYCEMIA: ICD-10-CM

## 2024-12-23 DIAGNOSIS — E66.812 CLASS 2 SEVERE OBESITY DUE TO EXCESS CALORIES WITH SERIOUS COMORBIDITY AND BODY MASS INDEX (BMI) OF 36.0 TO 36.9 IN ADULT: ICD-10-CM

## 2024-12-23 DIAGNOSIS — I10 PRIMARY HYPERTENSION: Primary | ICD-10-CM

## 2024-12-23 DIAGNOSIS — E66.01 CLASS 2 SEVERE OBESITY DUE TO EXCESS CALORIES WITH SERIOUS COMORBIDITY AND BODY MASS INDEX (BMI) OF 36.0 TO 36.9 IN ADULT: ICD-10-CM

## 2024-12-23 DIAGNOSIS — E78.2 MIXED HYPERLIPIDEMIA: ICD-10-CM

## 2024-12-25 LAB
25(OH)D3+25(OH)D2 SERPL-MCNC: 38.5 NG/ML (ref 30–100)
ALBUMIN SERPL-MCNC: 4.3 G/DL (ref 3.5–5.2)
ALBUMIN/GLOB SERPL: 1.7 G/DL
ALP SERPL-CCNC: 87 U/L (ref 39–117)
ALT SERPL-CCNC: 45 U/L (ref 1–41)
AST SERPL-CCNC: 47 U/L (ref 1–40)
BASOPHILS # BLD AUTO: 0.05 10*3/MM3 (ref 0–0.2)
BASOPHILS NFR BLD AUTO: 0.6 % (ref 0–1.5)
BILIRUB SERPL-MCNC: 0.7 MG/DL (ref 0–1.2)
BUN SERPL-MCNC: 29 MG/DL (ref 8–23)
BUN/CREAT SERPL: 25.2 (ref 7–25)
CALCIUM SERPL-MCNC: 9.9 MG/DL (ref 8.6–10.5)
CHLORIDE SERPL-SCNC: 100 MMOL/L (ref 98–107)
CHOLEST SERPL-MCNC: 128 MG/DL (ref 0–200)
CO2 SERPL-SCNC: 23.2 MMOL/L (ref 22–29)
CREAT SERPL-MCNC: 1.15 MG/DL (ref 0.76–1.27)
EGFRCR SERPLBLD CKD-EPI 2021: 69.8 ML/MIN/1.73
EOSINOPHIL # BLD AUTO: 0.27 10*3/MM3 (ref 0–0.4)
EOSINOPHIL NFR BLD AUTO: 3.4 % (ref 0.3–6.2)
ERYTHROCYTE [DISTWIDTH] IN BLOOD BY AUTOMATED COUNT: 12.9 % (ref 12.3–15.4)
GLOBULIN SER CALC-MCNC: 2.5 GM/DL
GLUCOSE SERPL-MCNC: 247 MG/DL (ref 65–99)
HBA1C MFR BLD: 9.4 % (ref 4.8–5.6)
HCT VFR BLD AUTO: 44.7 % (ref 37.5–51)
HDLC SERPL-MCNC: 42 MG/DL (ref 40–60)
HGB BLD-MCNC: 13.7 G/DL (ref 13–17.7)
IMM GRANULOCYTES # BLD AUTO: 0.01 10*3/MM3 (ref 0–0.05)
IMM GRANULOCYTES NFR BLD AUTO: 0.1 % (ref 0–0.5)
LDLC SERPL CALC-MCNC: 24 MG/DL (ref 0–100)
LDLC/HDLC SERPL: <0 {RATIO}
LYMPHOCYTES # BLD AUTO: 2.92 10*3/MM3 (ref 0.7–3.1)
LYMPHOCYTES NFR BLD AUTO: 36.9 % (ref 19.6–45.3)
MCH RBC QN AUTO: 28.8 PG (ref 26.6–33)
MCHC RBC AUTO-ENTMCNC: 30.6 G/DL (ref 31.5–35.7)
MCV RBC AUTO: 94.1 FL (ref 79–97)
MONOCYTES # BLD AUTO: 0.69 10*3/MM3 (ref 0.1–0.9)
MONOCYTES NFR BLD AUTO: 8.7 % (ref 5–12)
NEUTROPHILS # BLD AUTO: 3.98 10*3/MM3 (ref 1.7–7)
NEUTROPHILS NFR BLD AUTO: 50.3 % (ref 42.7–76)
NRBC BLD AUTO-RTO: 0 /100 WBC (ref 0–0.2)
PLATELET # BLD AUTO: 180 10*3/MM3 (ref 140–450)
POTASSIUM SERPL-SCNC: 4.3 MMOL/L (ref 3.5–5.2)
PROT SERPL-MCNC: 6.8 G/DL (ref 6–8.5)
RBC # BLD AUTO: 4.75 10*6/MM3 (ref 4.14–5.8)
SODIUM SERPL-SCNC: 139 MMOL/L (ref 136–145)
TRIGL SERPL-MCNC: 448 MG/DL (ref 0–150)
VLDLC SERPL CALC-MCNC: 62 MG/DL (ref 5–40)
WBC # BLD AUTO: 7.92 10*3/MM3 (ref 3.4–10.8)

## 2024-12-30 ENCOUNTER — OFFICE VISIT (OUTPATIENT)
Dept: FAMILY MEDICINE CLINIC | Facility: CLINIC | Age: 69
End: 2024-12-30
Payer: MEDICARE

## 2024-12-30 VITALS
HEIGHT: 74 IN | OXYGEN SATURATION: 95 % | BODY MASS INDEX: 36.47 KG/M2 | RESPIRATION RATE: 16 BRPM | HEART RATE: 80 BPM | WEIGHT: 284.2 LBS | SYSTOLIC BLOOD PRESSURE: 132 MMHG | DIASTOLIC BLOOD PRESSURE: 76 MMHG | TEMPERATURE: 97.4 F

## 2024-12-30 DIAGNOSIS — Z87.891 SMOKING HISTORY: ICD-10-CM

## 2024-12-30 DIAGNOSIS — E78.2 MIXED HYPERLIPIDEMIA: ICD-10-CM

## 2024-12-30 DIAGNOSIS — E55.9 VITAMIN D DEFICIENCY: ICD-10-CM

## 2024-12-30 DIAGNOSIS — I10 PRIMARY HYPERTENSION: ICD-10-CM

## 2024-12-30 DIAGNOSIS — E11.65 UNCONTROLLED TYPE 2 DIABETES MELLITUS WITH HYPERGLYCEMIA: ICD-10-CM

## 2024-12-30 DIAGNOSIS — G47.33 OBSTRUCTIVE SLEEP APNEA: ICD-10-CM

## 2024-12-30 DIAGNOSIS — D22.9 BENIGN MOLE: ICD-10-CM

## 2024-12-30 DIAGNOSIS — R79.89 ELEVATED LIVER FUNCTION TESTS: ICD-10-CM

## 2024-12-30 DIAGNOSIS — E78.1 HYPERTRIGLYCERIDEMIA: Primary | ICD-10-CM

## 2024-12-30 PROCEDURE — 3078F DIAST BP <80 MM HG: CPT | Performed by: NURSE PRACTITIONER

## 2024-12-30 PROCEDURE — 99214 OFFICE O/P EST MOD 30 MIN: CPT | Performed by: NURSE PRACTITIONER

## 2024-12-30 PROCEDURE — 3075F SYST BP GE 130 - 139MM HG: CPT | Performed by: NURSE PRACTITIONER

## 2024-12-30 PROCEDURE — 1126F AMNT PAIN NOTED NONE PRSNT: CPT | Performed by: NURSE PRACTITIONER

## 2024-12-30 PROCEDURE — 3046F HEMOGLOBIN A1C LEVEL >9.0%: CPT | Performed by: NURSE PRACTITIONER

## 2024-12-30 RX ORDER — ICOSAPENT ETHYL 1 G/1
2 CAPSULE ORAL 2 TIMES DAILY WITH MEALS
Qty: 120 CAPSULE | Refills: 3 | Status: SHIPPED | OUTPATIENT
Start: 2024-12-30

## 2024-12-30 RX ORDER — TERAZOSIN 5 MG/1
5 CAPSULE ORAL NIGHTLY
COMMUNITY

## 2024-12-30 NOTE — PROGRESS NOTES
Subjective     Amrik Trimble is a 69 y.o.. male.     Patient here today for hypertension, hyperlipidemia and diabetes.     Patient stating he has been eating poorly due to the holidays, is drinking some water during day and staying some what active.         Primary Care Follow-Up  Hypertension        The following portions of the patient's history were reviewed and updated as appropriate: allergies, current medications, past family history, past medical history, past social history, past surgical history and problem list.    Past Medical History:   Diagnosis Date    Age-related cataract of both eyes 12/27/2017    Ankle sprain     Aortic stenosis, mild 03/08/2021    Arthritis 07/08/2020    Formatting of this note might be different from the original. Added automatically from request for surgery [id number]    CAD (coronary artery disease) 05/10/2013    CTS (carpal tunnel syndrome)     Gout 04/24/2023    HTN (hypertension) 05/10/2013    Hyperkalemia 11/18/2021    Hyperlipidemia 05/10/2013    Knee sprain     Left carotid bruit 03/08/2021    Low back strain     Non-ST elevation MI (NSTEMI) 02/01/2012    Obesity 07/30/2014    Obstructive sleep apnea 04/24/2023    Formatting of this note might be different from the original. compliant    Osteoarthritis of both knees 02/10/2022    Periarthritis of shoulder     Proteinuria 02/15/2016    S/P PTCA (percutaneous transluminal coronary angioplasty) 07/30/2014    Smoking hx 05/10/2013    Stage 3 chronic kidney disease 11/18/2021    Tear of medial meniscus of knee 12/22/2015    Formatting of this note might be different from the original. Added automatically from request for surgery [id number]    Tear of meniscus of knee     Type 2 diabetes mellitus with diabetic chronic kidney disease 11/18/2021    Uncontrolled type 2 diabetes mellitus with hyperglycemia 01/02/2015    Vitamin D deficiency 03/17/2014    Wrist sprain        Past Surgical History:   Procedure Laterality Date    CORONARY  "ANGIOPLASTY      with stents    ELBOW ARTHROPLASTY Right     ELBOW PROCEDURE      HAND SURGERY Left     INGUINAL HERNIA REPAIR Bilateral     KIDNEY STONE SURGERY      KNEE SURGERY      MENISCECTOMY Bilateral     ROTATOR CUFF REPAIR Left     ROTATOR CUFF REPAIR Right     x 2    SHOULDER SURGERY      THUMB ARTHROSCOPY Left     re-attachement    WRIST SURGERY         Review of Systems   Constitutional: Negative.    Respiratory: Negative.     Cardiovascular: Negative.    Musculoskeletal: Negative.    Neurological: Negative.    Psychiatric/Behavioral: Negative.         No Known Allergies    Objective     Vitals:    12/30/24 0843   BP: 132/76   BP Location: Right arm   Patient Position: Sitting   Cuff Size: Adult   Pulse: 80   Resp: 16   Temp: 97.4 °F (36.3 °C)   TempSrc: Temporal   SpO2: 95%   Weight: 129 kg (284 lb 3.2 oz)   Height: 188 cm (74.02\")     Body mass index is 36.47 kg/m².    Physical Exam  Vitals reviewed.   HENT:      Head: Normocephalic.   Eyes:      Pupils: Pupils are equal, round, and reactive to light.   Neck:      Vascular: No carotid bruit.   Cardiovascular:      Rate and Rhythm: Normal rate and regular rhythm.      Pulses: Normal pulses.   Pulmonary:      Effort: Pulmonary effort is normal.      Breath sounds: Normal breath sounds.   Musculoskeletal:         General: Normal range of motion.      Right lower leg: No edema.      Left lower leg: No edema.   Skin:     General: Skin is warm and dry.      Comments: Moles noted to face and back  Dry skin noted to right temporal area and mid back   Neurological:      Mental Status: He is alert and oriented to person, place, and time.   Psychiatric:         Behavior: Behavior normal.           Current Outpatient Medications:     Accu-Chek Guide test strip, , Disp: , Rfl:     ACETAMINOPHEN PO, Take 1 tablet by mouth. (Patient taking differently: Take 1 tablet by mouth As Needed.), Disp: , Rfl:     allopurinol (ZYLOPRIM) 100 MG tablet, Take 1 tablet by mouth " Daily., Disp: 90 tablet, Rfl: 3    aspirin 81 MG EC tablet, Take 1 tablet by mouth., Disp: , Rfl:     Cholecalciferol 50 MCG (2000 UT) capsule, Take 1 capsule by mouth Daily., Disp: , Rfl:     Cinnamon 500 MG capsule, Take 2 capsules by mouth., Disp: , Rfl:     clotrimazole-betamethasone (LOTRISONE) 1-0.05 % cream, Apply a thin layer to affected ears once daily for 5 days in a row, then for reoccurrence, use once daily for 1-2 days, Disp: 45 g, Rfl: 1    Coenzyme Q10 200 MG capsule, Take 1 tablet by mouth Daily., Disp: , Rfl:     dapagliflozin Propanediol (Farxiga) 10 MG tablet, Take 1 tablet by mouth Daily., Disp: 90 tablet, Rfl: 1    eplerenone (INSPRA) 50 MG tablet, Take 1 tablet by mouth Daily., Disp: 90 tablet, Rfl: 3    glimepiride (AMARYL) 4 MG tablet, Take 1 tablet by mouth 2 (Two) Times a Day With Meals., Disp: 90 tablet, Rfl: 1    HYDROcodone-acetaminophen (NORCO) 5-325 MG per tablet, Take 1 tablet by mouth Every 6 (Six) Hours As Needed for Moderate Pain for up to 10 doses., Disp: 10 tablet, Rfl: 0    hydrocortisone 2.5 % ointment, Apply a thin layer to affected areas by topical route twice daily. Safe to use on face., Disp: 454 g, Rfl: 11    Insulin Degludec FlexTouch 200 UNIT/ML solution pen-injector, Inject 38 Units under the skin into the appropriate area as directed Daily., Disp: 15 mL, Rfl: 5    Insulin Pen Needle 32G X 4 MM misc, Use 1 each up to 4 (four) times daily., Disp: 200 each, Rfl: 5    ketotifen ([employer] Allergy Eye Drops) 0.025 % ophthalmic solution, Location: Both Eyes. Instill one drop in each affected eye as needed., Disp: 10 mL, Rfl: 11    metFORMIN ER (GLUCOPHAGE-XR) 500 MG 24 hr tablet, Take 2 tablets by mouth 2 (Two) Times a Day., Disp: 360 tablet, Rfl: 1    metoprolol succinate XL (Toprol XL) 50 MG 24 hr tablet, Take 1 tablet by mouth 2 (Two) Times a Day., Disp: 180 tablet, Rfl: 1    multivitamin (THERAGRAN) tablet tablet, Take 1 tablet by mouth Daily., Disp: , Rfl:     mupirocin  (BACTROBAN) 2 % ointment, Apply to affected area twice daily to open wounds. Use with hydrocortisone, Disp: 22 g, Rfl: 2    naloxone (NARCAN) 4 MG/0.1ML nasal spray, Call 911. Don't prime. Delta in 1 nostril for overdose. Repeat in 2-3 minutes in other nostril if no or minimal breathing/responsiveness., Disp: 2 each, Rfl: 0    olmesartan (Benicar) 40 MG tablet, Take 1 tablet by mouth Daily., Disp: 90 tablet, Rfl: 1    pimecrolimus (ELIDEL) 1 % cream, Use twice daily to areas of flare., Disp: 60 g, Rfl: 5    rosuvastatin (CRESTOR) 40 MG tablet, Take 1 tablet by mouth Every Night., Disp: 90 tablet, Rfl: 3    tacrolimus (PROTOPIC) 0.1 % ointment, Apply twice daily to affected area, Disp: 100 g, Rfl: 11    torsemide (DEMADEX) 10 MG tablet, Take 1 tablet by mouth Daily., Disp: 90 tablet, Rfl: 3    cloNIDine (CATAPRES) 0.1 MG tablet, Take 1 tablet by mouth Every Morning AND 1 tablet Every Evening AND 1 tablet every night at bedtime. (Patient not taking: Reported on 12/30/2024), Disp: 270 tablet, Rfl: 3    Easy Touch Lancets 30G/Twist misc, , Disp: , Rfl:     econazole nitrate (SPECTAZOLE) 1 % cream, Apply topically to the affected and surrounding areas of skin as directed 2 (Two) Times a Day., Disp: 85 g, Rfl: 1    ezetimibe (ZETIA) 10 MG tablet, Take 1 tablet by mouth Daily. (Patient not taking: Reported on 12/30/2024), Disp: 90 tablet, Rfl: 3    icosapent ethyl (Vascepa) 1 g capsule capsule, Take 2 capsules by mouth 2 (Two) Times a Day With Meals., Disp: 120 capsule, Rfl: 3    terazosin (HYTRIN) 5 MG capsule, Take 1 capsule by mouth Every Night., Disp: , Rfl:     thiamine (VITAMIN B1) 100 MG tablet, Take 1 tablet by mouth., Disp: , Rfl:     Recent Results (from the past 2 weeks)   CBC & Differential    Collection Time: 12/24/24  8:44 AM    Specimen: Blood   Result Value Ref Range    WBC 7.92 3.40 - 10.80 10*3/mm3    RBC 4.75 4.14 - 5.80 10*6/mm3    Hemoglobin 13.7 13.0 - 17.7 g/dL    Hematocrit 44.7 37.5 - 51.0 %    MCV  94.1 79.0 - 97.0 fL    MCH 28.8 26.6 - 33.0 pg    MCHC 30.6 (L) 31.5 - 35.7 g/dL    RDW 12.9 12.3 - 15.4 %    Platelets 180 140 - 450 10*3/mm3    Neutrophil Rel % 50.3 42.7 - 76.0 %    Lymphocyte Rel % 36.9 19.6 - 45.3 %    Monocyte Rel % 8.7 5.0 - 12.0 %    Eosinophil Rel % 3.4 0.3 - 6.2 %    Basophil Rel % 0.6 0.0 - 1.5 %    Neutrophils Absolute 3.98 1.70 - 7.00 10*3/mm3    Lymphocytes Absolute 2.92 0.70 - 3.10 10*3/mm3    Monocytes Absolute 0.69 0.10 - 0.90 10*3/mm3    Eosinophils Absolute 0.27 0.00 - 0.40 10*3/mm3    Basophils Absolute 0.05 0.00 - 0.20 10*3/mm3    Immature Granulocyte Rel % 0.1 0.0 - 0.5 %    Immature Grans Absolute 0.01 0.00 - 0.05 10*3/mm3    nRBC 0.0 0.0 - 0.2 /100 WBC   Comprehensive Metabolic Panel    Collection Time: 12/24/24  8:44 AM    Specimen: Blood   Result Value Ref Range    Glucose 247 (H) 65 - 99 mg/dL    BUN 29 (H) 8 - 23 mg/dL    Creatinine 1.15 0.76 - 1.27 mg/dL    EGFR Result 69.8 >60.0 mL/min/1.73    BUN/Creatinine Ratio 25.2 (H) 7.0 - 25.0    Sodium 139 136 - 145 mmol/L    Potassium 4.3 3.5 - 5.2 mmol/L    Chloride 100 98 - 107 mmol/L    Total CO2 23.2 22.0 - 29.0 mmol/L    Calcium 9.9 8.6 - 10.5 mg/dL    Total Protein 6.8 6.0 - 8.5 g/dL    Albumin 4.3 3.5 - 5.2 g/dL    Globulin 2.5 gm/dL    A/G Ratio 1.7 g/dL    Total Bilirubin 0.7 0.0 - 1.2 mg/dL    Alkaline Phosphatase 87 39 - 117 U/L    AST (SGOT) 47 (H) 1 - 40 U/L    ALT (SGPT) 45 (H) 1 - 41 U/L   Lipid Panel With LDL / HDL Ratio    Collection Time: 12/24/24  8:44 AM    Specimen: Blood   Result Value Ref Range    Total Cholesterol 128 0 - 200 mg/dL    Triglycerides 448 (H) 0 - 150 mg/dL    HDL Cholesterol 42 40 - 60 mg/dL    VLDL Cholesterol Charles 62 (H) 5 - 40 mg/dL    LDL Chol Calc (NIH) 24 0 - 100 mg/dL    LDL/HDL RATIO <0.00    Vitamin D,25-Hydroxy    Collection Time: 12/24/24  8:44 AM    Specimen: Blood   Result Value Ref Range    25 Hydroxy, Vitamin D 38.5 30.0 - 100.0 ng/ml   Hemoglobin A1c    Collection Time:  12/24/24  8:44 AM    Specimen: Blood   Result Value Ref Range    Hemoglobin A1C 9.40 (H) 4.80 - 5.60 %       CT Abdomen Pelvis Stone Protocol  CT ABDOMEN AND PELVIS WITHOUT IV CONTRAST     HISTORY: 68-year-old male with right flank pain and hematuria. Inguinal  hernia repair in the past.     TECHNIQUE: Radiation dose reduction techniques were utilized, including  automated exposure control and exposure modulation based on body size.   3 mm images were obtained through the abdomen and pelvis without the  administration of IV contrast. No priors for comparison.     FINDINGS:  1. There are 2 punctate, 1-2 mm, calcific densities within the distal  right ureter, without hydronephrosis, but there is mild right  perinephric stranding. There is a single 4 mm nonobstructing stone  within the right kidney. There are no left renal stones. There are  multiple renal vascular calcifications bilaterally.     There are no stones within the urinary bladder. The prostate appears  enlarged measuring nearly 6 cm. PSA follow-up is recommended.     2. Noncontrasted liver, gallbladder, pancreas, spleen, adrenals appear  unremarkable. There is no acute bowel abnormality. No appendicitis.  There are moderate abdominal aortic atherosclerotic changes without  aneurysmal dilatation.     3. There is a partially calcified sharply circumscribed 2.2 x 1.9 cm  density deep to the abdominal wall left of midline at the pelvis which  is almost certainly benign representing an old hematoma or focus of fat  necrosis, possibly related to previous inguinal hernia repair.     This report was finalized on 7/3/2024 9:03 PM by Dr. Arina Garcia M.D on  Workstation: [id number]           Diagnoses and all orders for this visit:    1. Hypertriglyceridemia (Primary)  -     icosapent ethyl (Vascepa) 1 g capsule capsule; Take 2 capsules by mouth 2 (Two) Times a Day With Meals.  Dispense: 120 capsule; Refill: 3  -     Lipid Panel With LDL / HDL Ratio; Future    2.  Primary hypertension  -     CBC & Differential; Future  -     Urinalysis With Microscopic - Urine, Clean Catch; Future    3. Mixed hyperlipidemia  -     Lipid Panel With LDL / HDL Ratio; Future    4. Uncontrolled type 2 diabetes mellitus with hyperglycemia  -     Comprehensive Metabolic Panel; Future  -     Hemoglobin A1c; Future    5. Vitamin D deficiency    6. Elevated liver function tests  -     Comprehensive Metabolic Panel; Future    7. Obstructive sleep apnea    8. Benign mole  -     Ambulatory Referral to Dermatology    9. Smoking history  -     CT Chest Low Dose Wo; Future        Patient Instructions   Hypertriglyceridemia: increasing vascepa to 2 gm twice a day    Hypertension: stable, continue clonidine 0.1 mg 2 tabs three times a day, eplerenone 50 mg 1 tab daily, Toprol XL 50 mg 1 tab twice a day, and olmesartan 40 mg 1 Tab daily.  Follow up with Dr. Lee as directed/as needed    Hyperlipidemia: LDL stable at 24, continue rosuvastatin 40 mg 1 tab daily and ezetimibe 10 mg daily    Diabetes: uncontrolled, HgA1c 9.4, labs sent to endocrinologist this am and Patient to follow up with endocrinology AKI Constantino    Vitamin D deficiency: stable, continue to monitor    Elevated liver function: discussed in detail, recheck in 6 months    MICHAEL: continue to use CPAP nightly    Moles: Patient requesting referral to dermatologist, referral placed.       Return for fasting labs in 6 months, [employer] [facility].

## 2024-12-30 NOTE — PATIENT INSTRUCTIONS
Hypertriglyceridemia: increasing vascepa to 2 gm twice a day    Hypertension: stable, continue clonidine 0.1 mg 2 tabs three times a day, eplerenone 50 mg 1 tab daily, Toprol XL 50 mg 1 tab twice a day, and olmesartan 40 mg 1 Tab daily.  Follow up with Dr. Lee as directed/as needed    Hyperlipidemia: LDL stable at 24, continue rosuvastatin 40 mg 1 tab daily and ezetimibe 10 mg daily    Diabetes: uncontrolled, HgA1c 9.4, labs sent to endocrinologist this am and Patient to follow up with endocrinology KAI Constantino    Vitamin D deficiency: stable, continue to monitor    Elevated liver function: discussed in detail, recheck in 6 months    MICHAEL: continue to use CPAP nightly    Moles: Patient requesting referral to dermatologist, referral placed.

## 2025-01-21 ENCOUNTER — HOSPITAL ENCOUNTER (OUTPATIENT)
Dept: CT IMAGING | Facility: HOSPITAL | Age: 70
Discharge: HOME | End: 2025-01-21
Admitting: NURSE PRACTITIONER
Payer: COMMERCIAL

## 2025-01-21 DIAGNOSIS — Z87.891 SMOKING HISTORY: ICD-10-CM

## 2025-01-21 PROCEDURE — 71271 CT THORAX LUNG CANCER SCR C-: CPT

## 2025-03-09 ENCOUNTER — HOSPITAL ENCOUNTER (EMERGENCY)
Facility: HOSPITAL | Age: 70
Discharge: HOME | End: 2025-03-09
Attending: EMERGENCY MEDICINE | Admitting: EMERGENCY MEDICINE
Payer: COMMERCIAL

## 2025-03-09 ENCOUNTER — APPOINTMENT (OUTPATIENT)
Dept: CT IMAGING | Facility: HOSPITAL | Age: 70
End: 2025-03-09
Payer: MEDICARE

## 2025-03-09 VITALS
SYSTOLIC BLOOD PRESSURE: 140 MMHG | RESPIRATION RATE: 17 BRPM | HEART RATE: 79 BPM | TEMPERATURE: 96.7 F | OXYGEN SATURATION: 94 % | DIASTOLIC BLOOD PRESSURE: 70 MMHG

## 2025-03-09 DIAGNOSIS — M54.16 ACUTE RIGHT LUMBAR RADICULOPATHY: Primary | ICD-10-CM

## 2025-03-09 LAB
ALBUMIN SERPL-MCNC: 4.5 G/DL (ref 3.5–5.2)
ALBUMIN/GLOB SERPL: 1.4 G/DL
ALP SERPL-CCNC: 81 U/L (ref 39–117)
ALT SERPL W P-5'-P-CCNC: 40 U/L (ref 1–41)
ANION GAP SERPL CALCULATED.3IONS-SCNC: 14.4 MMOL/L (ref 5–15)
AST SERPL-CCNC: 32 U/L (ref 1–40)
BACTERIA UR QL AUTO: NORMAL /HPF
BASOPHILS # BLD AUTO: 0.03 10*3/MM3 (ref 0–0.2)
BASOPHILS NFR BLD AUTO: 0.4 % (ref 0–1.5)
BILIRUB SERPL-MCNC: 0.7 MG/DL (ref 0–1.2)
BILIRUB UR QL STRIP: NEGATIVE
BUN SERPL-MCNC: 21 MG/DL (ref 8–23)
BUN/CREAT SERPL: 17.4 (ref 7–25)
CALCIUM SPEC-SCNC: 10.5 MG/DL (ref 8.6–10.5)
CHLORIDE SERPL-SCNC: 101 MMOL/L (ref 98–107)
CLARITY UR: CLEAR
CO2 SERPL-SCNC: 22.6 MMOL/L (ref 22–29)
COLOR UR: YELLOW
CREAT SERPL-MCNC: 1.21 MG/DL (ref 0.76–1.27)
D-LACTATE SERPL-SCNC: 2.7 MMOL/L (ref 0.5–2)
D-LACTATE SERPL-SCNC: 3.6 MMOL/L (ref 0.5–2)
DEPRECATED RDW RBC AUTO: 44.2 FL (ref 37–54)
EGFRCR SERPLBLD CKD-EPI 2021: 65.6 ML/MIN/1.73
EOSINOPHIL # BLD AUTO: 0.15 10*3/MM3 (ref 0–0.4)
EOSINOPHIL NFR BLD AUTO: 1.9 % (ref 0.3–6.2)
ERYTHROCYTE [DISTWIDTH] IN BLOOD BY AUTOMATED COUNT: 12.9 % (ref 12.3–15.4)
GLOBULIN UR ELPH-MCNC: 3.3 GM/DL
GLUCOSE SERPL-MCNC: 246 MG/DL (ref 65–99)
GLUCOSE UR STRIP-MCNC: ABNORMAL MG/DL
HCT VFR BLD AUTO: 47.4 % (ref 37.5–51)
HGB BLD-MCNC: 15.4 G/DL (ref 13–17.7)
HGB UR QL STRIP.AUTO: NEGATIVE
HOLD SPECIMEN: NORMAL
HOLD SPECIMEN: NORMAL
HYALINE CASTS UR QL AUTO: NORMAL /LPF
IMM GRANULOCYTES # BLD AUTO: 0.02 10*3/MM3 (ref 0–0.05)
IMM GRANULOCYTES NFR BLD AUTO: 0.3 % (ref 0–0.5)
KETONES UR QL STRIP: ABNORMAL
LEUKOCYTE ESTERASE UR QL STRIP.AUTO: NEGATIVE
LIPASE SERPL-CCNC: 182 U/L (ref 13–60)
LYMPHOCYTES # BLD AUTO: 2.14 10*3/MM3 (ref 0.7–3.1)
LYMPHOCYTES NFR BLD AUTO: 27.3 % (ref 19.6–45.3)
MCH RBC QN AUTO: 30 PG (ref 26.6–33)
MCHC RBC AUTO-ENTMCNC: 32.5 G/DL (ref 31.5–35.7)
MCV RBC AUTO: 92.4 FL (ref 79–97)
MONOCYTES # BLD AUTO: 0.47 10*3/MM3 (ref 0.1–0.9)
MONOCYTES NFR BLD AUTO: 6 % (ref 5–12)
NEUTROPHILS NFR BLD AUTO: 5.03 10*3/MM3 (ref 1.7–7)
NEUTROPHILS NFR BLD AUTO: 64.1 % (ref 42.7–76)
NITRITE UR QL STRIP: NEGATIVE
NRBC BLD AUTO-RTO: 0 /100 WBC (ref 0–0.2)
PH UR STRIP.AUTO: <=5 [PH] (ref 5–8)
PLATELET # BLD AUTO: 187 10*3/MM3 (ref 140–450)
PMV BLD AUTO: 11.2 FL (ref 6–12)
POTASSIUM SERPL-SCNC: 4.5 MMOL/L (ref 3.5–5.2)
PROT SERPL-MCNC: 7.8 G/DL (ref 6–8.5)
PROT UR QL STRIP: ABNORMAL
RBC # BLD AUTO: 5.13 10*6/MM3 (ref 4.14–5.8)
RBC # UR STRIP: NORMAL /HPF
REF LAB TEST METHOD: NORMAL
SODIUM SERPL-SCNC: 138 MMOL/L (ref 136–145)
SP GR UR STRIP: >1.03 (ref 1–1.03)
SQUAMOUS #/AREA URNS HPF: NORMAL /HPF
UROBILINOGEN UR QL STRIP: ABNORMAL
WBC # UR STRIP: NORMAL /HPF
WBC NRBC COR # BLD AUTO: 7.84 10*3/MM3 (ref 3.4–10.8)
WHOLE BLOOD HOLD COAG: NORMAL
WHOLE BLOOD HOLD SPECIMEN: NORMAL

## 2025-03-09 PROCEDURE — 99284 EMERGENCY DEPT VISIT MOD MDM: CPT

## 2025-03-09 PROCEDURE — 83690 ASSAY OF LIPASE: CPT | Performed by: EMERGENCY MEDICINE

## 2025-03-09 PROCEDURE — 81001 URINALYSIS AUTO W/SCOPE: CPT | Performed by: EMERGENCY MEDICINE

## 2025-03-09 PROCEDURE — 85025 COMPLETE CBC W/AUTO DIFF WBC: CPT | Performed by: EMERGENCY MEDICINE

## 2025-03-09 PROCEDURE — 83605 ASSAY OF LACTIC ACID: CPT | Performed by: EMERGENCY MEDICINE

## 2025-03-09 PROCEDURE — 80053 COMPREHEN METABOLIC PANEL: CPT | Performed by: EMERGENCY MEDICINE

## 2025-03-09 PROCEDURE — 74176 CT ABD & PELVIS W/O CONTRAST: CPT

## 2025-03-09 PROCEDURE — 36415 COLL VENOUS BLD VENIPUNCTURE: CPT

## 2025-03-09 RX ORDER — SODIUM CHLORIDE 0.9 % (FLUSH) 0.9 %
10 SYRINGE (ML) INJECTION AS NEEDED
Status: DISCONTINUED | OUTPATIENT
Start: 2025-03-09 | End: 2025-03-09 | Stop reason: HOSPADM

## 2025-03-09 RX ORDER — LIDOCAINE 4 G/G
1 PATCH TOPICAL ONCE
Status: DISCONTINUED | OUTPATIENT
Start: 2025-03-09 | End: 2025-03-09 | Stop reason: HOSPADM

## 2025-03-09 RX ORDER — LIDOCAINE 50 MG/G
1 PATCH TOPICAL EVERY 24 HOURS
Qty: 10 PATCH | Refills: 0 | Status: SHIPPED | OUTPATIENT
Start: 2025-03-09

## 2025-03-09 RX ORDER — TRAMADOL HYDROCHLORIDE 50 MG/1
50 TABLET ORAL ONCE
Status: COMPLETED | OUTPATIENT
Start: 2025-03-09 | End: 2025-03-09

## 2025-03-09 RX ORDER — ACETAMINOPHEN 500 MG
1000 TABLET ORAL ONCE
Status: COMPLETED | OUTPATIENT
Start: 2025-03-09 | End: 2025-03-09

## 2025-03-09 RX ORDER — TRAMADOL HYDROCHLORIDE 50 MG/1
50 TABLET ORAL EVERY 8 HOURS PRN
Qty: 9 TABLET | Refills: 0 | Status: SHIPPED | OUTPATIENT
Start: 2025-03-09

## 2025-03-09 RX ORDER — CYCLOBENZAPRINE HCL 10 MG
10 TABLET ORAL 3 TIMES DAILY PRN
Qty: 30 TABLET | Refills: 0 | Status: SHIPPED | OUTPATIENT
Start: 2025-03-09

## 2025-03-09 RX ORDER — ACETAMINOPHEN 500 MG
1000 TABLET ORAL EVERY 8 HOURS PRN
Qty: 60 TABLET | Refills: 0 | Status: SHIPPED | OUTPATIENT
Start: 2025-03-09

## 2025-03-09 RX ORDER — CYCLOBENZAPRINE HCL 10 MG
10 TABLET ORAL ONCE
Status: COMPLETED | OUTPATIENT
Start: 2025-03-09 | End: 2025-03-09

## 2025-03-09 RX ADMIN — LIDOCAINE PAIN RELIEF 1 PATCH: 560 PATCH TOPICAL at 19:51

## 2025-03-09 RX ADMIN — CYCLOBENZAPRINE HYDROCHLORIDE 10 MG: 10 TABLET, FILM COATED ORAL at 19:52

## 2025-03-09 RX ADMIN — TRAMADOL HYDROCHLORIDE 50 MG: 50 TABLET, COATED ORAL at 19:55

## 2025-03-09 RX ADMIN — ACETAMINOPHEN 1000 MG: 500 TABLET, FILM COATED ORAL at 19:52

## 2025-03-09 NOTE — ED PROVIDER NOTES
EMERGENCY DEPARTMENT ENCOUNTER  Room Number:  26/26  PCP: Rupinder Link APRN  Independent Historians: Patient      HPI:  Chief Complaint: had concerns including Spasms.     A complete HPI/ROS/PMH/PSH/SH/FH are unobtainable due to: N/A      Context: Amrik Trimble III is a 69 y.o. male with a medical history of kidney stones, CAD, hypertension, diabetes who presents to the ED c/o acute right lower back pain with radiation to his right groin.  Symptoms have been intermittent for the last few days.  He now reports that he is getting intermittent painful spasms.  He has had kidney stones in the past but this does not necessarily feel like a kidney stone.  He denies dysuria or hematuria.  No fever or chills.  He denies abdominal pain, vomiting, diarrhea.  He denies any known injury or trauma.  He has had no bowel or bladder incontinence.  He denies any weakness, tingling, numbness in his lower extremities.      Review of prior external notes (non-ED) -and- Review of prior external test results outside of this encounter: See ED course below        PAST MEDICAL HISTORY  Active Ambulatory Problems     Diagnosis Date Noted    Age-related cataract of both eyes 12/27/2017    Aortic stenosis, mild 03/08/2021    Arthritis 07/08/2020    CAD (coronary artery disease) 05/10/2013    HTN (hypertension) 05/10/2013    Hyperkalemia 11/18/2021    Hyperlipidemia 05/10/2013    Knee pain 06/05/2014    Left carotid bruit 03/08/2021    Loose body in elbow joint, right 07/06/2020    Non-ST elevation MI (NSTEMI) 02/01/2012    Obesity 07/30/2014    Obstructive sleep apnea 04/24/2023    Osteoarthritis of both knees 02/10/2022    Proteinuria 02/15/2016    S/P PTCA (percutaneous transluminal coronary angioplasty) 07/30/2014    Smoking hx 05/10/2013    Stage 3 chronic kidney disease 11/18/2021    Tear of medial meniscus of knee 12/22/2015    Type 2 diabetes mellitus with diabetic chronic kidney disease 11/18/2021    Uncontrolled type 2  diabetes mellitus with hyperglycemia 2015    Vitamin D deficiency 2014    Gout 2023    Abnormal TSH 2023    Subclinical hypothyroidism 2023    Hypothyroidism 2024     Resolved Ambulatory Problems     Diagnosis Date Noted    No Resolved Ambulatory Problems     Past Medical History:   Diagnosis Date    Ankle sprain     CTS (carpal tunnel syndrome)     Knee sprain     Low back strain     Periarthritis of shoulder     Tear of meniscus of knee     Wrist sprain          PAST SURGICAL HISTORY  Past Surgical History:   Procedure Laterality Date    CORONARY ANGIOPLASTY      with stents    ELBOW ARTHROPLASTY Right     ELBOW PROCEDURE      HAND SURGERY Left     INGUINAL HERNIA REPAIR Bilateral     KIDNEY STONE SURGERY      KNEE SURGERY      MENISCECTOMY Bilateral     ROTATOR CUFF REPAIR Left     ROTATOR CUFF REPAIR Right     x 2    SHOULDER SURGERY      THUMB ARTHROSCOPY Left     re-attachement    WRIST SURGERY           FAMILY HISTORY  Family History   Problem Relation Age of Onset    Emphysema Mother     No Known Problems Father     No Known Problems Sister     No Known Problems Brother     Diabetes Maternal Grandmother     Arthritis Maternal Grandfather     Kidney disease Maternal Grandfather     Glaucoma Paternal Grandmother     No Known Problems Paternal Grandfather          SOCIAL HISTORY  Social History     Socioeconomic History    Marital status:    Tobacco Use    Smoking status: Former     Current packs/day: 0.00     Average packs/day: 1.5 packs/day for 41.0 years (61.5 ttl pk-yrs)     Types: Cigarettes     Start date: 1971     Quit date: 2012     Years since quittin.1     Passive exposure: Past    Smokeless tobacco: Never    Tobacco comments:     Quit in ; 1+PPD   Vaping Use    Vaping status: Never Used   Substance and Sexual Activity    Alcohol use: Not Currently     Comment: rarely    Drug use: Never    Sexual activity: Yes     Partners: Female     Birth  control/protection: Pill         ALLERGIES  Patient has no known allergies.      REVIEW OF SYSTEMS  Review of all 14 systems is negative other than stated in the HPI above.      PHYSICAL EXAM    I have reviewed the triage vital signs and nursing notes.    ED Triage Vitals   Temp Heart Rate Resp BP SpO2   03/09/25 1353 03/09/25 1353 03/09/25 1353 03/09/25 1433 03/09/25 1353   96.7 °F (35.9 °C) 79 16 122/86 97 %      Temp src Heart Rate Source Patient Position BP Location FiO2 (%)   03/09/25 1353 03/09/25 1353 -- -- --   Tympanic Monitor            GENERAL: awake and alert, no acute distress  HENT: Normocephalic, atraumatic  EYES: no scleral icterus  CV: regular rhythm, regular rate  RESPIRATORY: normal effort  ABDOMEN: soft, nondistended, nontender throughout, no point tenderness of McBurney's point.  MUSCULOSKELETAL: no deformity, mild point tenderness of the right lumbar paraspinous musculature, no midline L-spine tenderness or step-off  NEURO: alert, moves all extremities, follows commands, normal strength and sensation throughout bilateral lower extremities.  PSYCH: calm, cooperative  SKIN: Warm, dry, no rash right flank or right abdominal wall          LAB RESULTS  Recent Results (from the past 24 hours)   Urinalysis With Microscopic If Indicated (No Culture) - Urine, Clean Catch    Collection Time: 03/09/25  2:45 PM    Specimen: Urine, Clean Catch   Result Value Ref Range    Color, UA Yellow Yellow, Straw    Appearance, UA Clear Clear    pH, UA <=5.0 5.0 - 8.0    Specific Gravity, UA >1.030 (H) 1.005 - 1.030    Glucose, UA >=1000 mg/dL (3+) (A) Negative    Ketones, UA Trace (A) Negative    Bilirubin, UA Negative Negative    Blood, UA Negative Negative    Protein,  mg/dL (2+) (A) Negative    Leuk Esterase, UA Negative Negative    Nitrite, UA Negative Negative    Urobilinogen, UA 0.2 E.U./dL 0.2 - 1.0 E.U./dL   Urinalysis, Microscopic Only - Urine, Clean Catch    Collection Time: 03/09/25  2:45 PM     Specimen: Urine, Clean Catch   Result Value Ref Range    RBC, UA 0-2 None Seen, 0-2 /HPF    WBC, UA 0-2 None Seen, 0-2 /HPF    Bacteria, UA None Seen None Seen /HPF    Squamous Epithelial Cells, UA 0-2 None Seen, 0-2 /HPF    Hyaline Casts, UA 13-20 None Seen /LPF    Methodology Automated Microscopy    Comprehensive Metabolic Panel    Collection Time: 03/09/25  2:48 PM    Specimen: Arm, Right; Blood   Result Value Ref Range    Glucose 246 (H) 65 - 99 mg/dL    BUN 21 8 - 23 mg/dL    Creatinine 1.21 0.76 - 1.27 mg/dL    Sodium 138 136 - 145 mmol/L    Potassium 4.5 3.5 - 5.2 mmol/L    Chloride 101 98 - 107 mmol/L    CO2 22.6 22.0 - 29.0 mmol/L    Calcium 10.5 8.6 - 10.5 mg/dL    Total Protein 7.8 6.0 - 8.5 g/dL    Albumin 4.5 3.5 - 5.2 g/dL    ALT (SGPT) 40 1 - 41 U/L    AST (SGOT) 32 1 - 40 U/L    Alkaline Phosphatase 81 39 - 117 U/L    Total Bilirubin 0.7 0.0 - 1.2 mg/dL    Globulin 3.3 gm/dL    A/G Ratio 1.4 g/dL    BUN/Creatinine Ratio 17.4 7.0 - 25.0    Anion Gap 14.4 5.0 - 15.0 mmol/L    eGFR 65.6 >60.0 mL/min/1.73   Lipase    Collection Time: 03/09/25  2:48 PM    Specimen: Arm, Right; Blood   Result Value Ref Range    Lipase 182 (H) 13 - 60 U/L   Lactic Acid, Plasma    Collection Time: 03/09/25  2:48 PM    Specimen: Arm, Right; Blood   Result Value Ref Range    Lactate 3.6 (C) 0.5 - 2.0 mmol/L   Green Top (Gel)    Collection Time: 03/09/25  2:48 PM   Result Value Ref Range    Extra Tube Hold for add-ons.    Lavender Top    Collection Time: 03/09/25  2:48 PM   Result Value Ref Range    Extra Tube hold for add-on    Gold Top - SST    Collection Time: 03/09/25  2:48 PM   Result Value Ref Range    Extra Tube Hold for add-ons.    Light Blue Top    Collection Time: 03/09/25  2:48 PM   Result Value Ref Range    Extra Tube Hold for add-ons.    CBC Auto Differential    Collection Time: 03/09/25  2:48 PM    Specimen: Arm, Right; Blood   Result Value Ref Range    WBC 7.84 3.40 - 10.80 10*3/mm3    RBC 5.13 4.14 - 5.80  10*6/mm3    Hemoglobin 15.4 13.0 - 17.7 g/dL    Hematocrit 47.4 37.5 - 51.0 %    MCV 92.4 79.0 - 97.0 fL    MCH 30.0 26.6 - 33.0 pg    MCHC 32.5 31.5 - 35.7 g/dL    RDW 12.9 12.3 - 15.4 %    RDW-SD 44.2 37.0 - 54.0 fl    MPV 11.2 6.0 - 12.0 fL    Platelets 187 140 - 450 10*3/mm3    Neutrophil % 64.1 42.7 - 76.0 %    Lymphocyte % 27.3 19.6 - 45.3 %    Monocyte % 6.0 5.0 - 12.0 %    Eosinophil % 1.9 0.3 - 6.2 %    Basophil % 0.4 0.0 - 1.5 %    Immature Grans % 0.3 0.0 - 0.5 %    Neutrophils, Absolute 5.03 1.70 - 7.00 10*3/mm3    Lymphocytes, Absolute 2.14 0.70 - 3.10 10*3/mm3    Monocytes, Absolute 0.47 0.10 - 0.90 10*3/mm3    Eosinophils, Absolute 0.15 0.00 - 0.40 10*3/mm3    Basophils, Absolute 0.03 0.00 - 0.20 10*3/mm3    Immature Grans, Absolute 0.02 0.00 - 0.05 10*3/mm3    nRBC 0.0 0.0 - 0.2 /100 WBC   STAT Lactic Acid, Reflex    Collection Time: 03/09/25  7:26 PM    Specimen: Blood   Result Value Ref Range    Lactate 2.7 (C) 0.5 - 2.0 mmol/L       The above labs were ordered by me and independently reviewed by me.     RADIOLOGY  CT Abdomen Pelvis Without Contrast  Result Date: 3/9/2025  CT ABDOMEN AND PELVIS WITHOUT CONTRAST:  HISTORY: right flank pain, radiates to r groin  TECHNIQUE: Helical CT was performed of the abdomen and pelvis from the lung bases through the lesser trochanters without IV contrast. Reformatted images were provided in the coronal and sagittal planes. Radiation dose reduction techniques were utilized, including automated exposure control, and exposure modulation based on body size.  COMPARISON: None available.  FINDINGS:  Lung bases: Visualized lung bases are unremarkable.  Abdomen: The liver and gallbladder are normal.  The spleen and pancreas appear normal.  Both adrenal glands are normal. There is no evidence of bowel obstruction. The aorta is normal in caliber.   There are bilateral renal hilar vascular calcifications. The left kidney appears otherwise normal. There is a right renal  lower pole 3 to 4 mm nonobstructing calculus. There is no hydronephrosis or ureterolithiasis on either side, and the urinary bladder is unremarkable.  Pelvis:  The appendix is clearly identified, and is normal.  There is no pelvic inflammatory change or other abnormal fluid collection.  There is no pelvic adenopathy or suspicious soft tissue mass. Approximately 2 cm calcified pelvic nodule unchanged, continued benign appearance. There is no CT evidence of hernia or bowel obstruction.  There is no acute bony abnormality.       Right renal lower pole approximately 3 mm nonobstructing calculus, normal left kidney. No hydronephrosis or ureterolithiasis on either side.     This report was finalized on 3/9/2025 7:07 PM by Dr. Ney Young M.D on Workstation: [id number]        The above radiology studies were ordered by me.  See ED course for independent interpretations.     MEDICATIONS GIVEN IN ER  Medications   sodium chloride 0.9 % flush 10 mL (has no administration in time range)   Lidocaine 4 % 1 patch (1 patch Transdermal Medication Applied 3/9/25 1951)   cyclobenzaprine (FLEXERIL) tablet 10 mg (10 mg Oral Given 3/9/25 1952)   acetaminophen (TYLENOL) tablet 1,000 mg (1,000 mg Oral Given 3/9/25 1952)   traMADol (ULTRAM) tablet 50 mg (50 mg Oral Given 3/9/25 1955)         ORDERS PLACED DURING THIS VISIT:  Orders Placed This Encounter   Procedures    CT Abdomen Pelvis Without Contrast    [place] Draw    Comprehensive Metabolic Panel    Lipase    Urinalysis With Microscopic If Indicated (No Culture) - Urine, Clean Catch    Lactic Acid, Plasma    CBC Auto Differential    Urinalysis, Microscopic Only - Urine, Clean Catch    STAT Lactic Acid, Reflex    STAT Lactic Acid, Reflex    Ambulatory Referral to Neurosurgery (All except [place])    NPO Diet NPO Type: Strict NPO    Undress & Gown    Insert Peripheral IV    CBC & Differential    Green Top (Gel)    Lavender Top    Gold Top - SST    Light Blue Top         OUTPATIENT  MEDICATION MANAGEMENT:  Current Facility-Administered Medications Ordered in Epic   Medication Dose Route Frequency Provider Last Rate Last Admin    Lidocaine 4 % 1 patch  1 patch Transdermal Once Wilber Roe MD   1 patch at 03/09/25 1951    sodium chloride 0.9 % flush 10 mL  10 mL Intravenous PRN Wilber Roe MD         Current Outpatient Medications Ordered in Epic   Medication Sig Dispense Refill    Accu-Chek Guide test strip       acetaminophen (TYLENOL) 500 MG tablet Take 2 tablets by mouth Every 8 (Eight) Hours As Needed for Mild Pain. 60 tablet 0    allopurinol (ZYLOPRIM) 100 MG tablet Take 1 tablet by mouth Daily. 90 tablet 3    aspirin 81 MG EC tablet Take 1 tablet by mouth.      Cholecalciferol 50 MCG (2000 UT) capsule Take 1 capsule by mouth Daily.      Cinnamon 500 MG capsule Take 2 capsules by mouth.      cloNIDine (CATAPRES) 0.1 MG tablet Take 1 tablet by mouth Every Morning AND 1 tablet Every Evening AND 1 tablet every night at bedtime. (Patient not taking: Reported on 12/30/2024) 270 tablet 3    clotrimazole-betamethasone (LOTRISONE) 1-0.05 % cream Apply a thin layer to affected ears once daily for 5 days in a row, then for reoccurrence, use once daily for 1-2 days 45 g 1    Coenzyme Q10 200 MG capsule Take 1 tablet by mouth Daily.      cyclobenzaprine (FLEXERIL) 10 MG tablet Take 1 tablet by mouth 3 (Three) Times a Day As Needed for Muscle Spasms. 30 tablet 0    dapagliflozin Propanediol 10 MG tablet Take 1 tablet by mouth daily. 90 tablet 1    desonide (DESOWEN) 0.05 % ointment Apply sparingly to the affected area on the scaly areas on the periorbital area and some on the eyebrow lid area as directed 2 (Two) Times a Day. 60 g 1    Easy Touch Lancets 30G/Twist misc       econazole nitrate (SPECTAZOLE) 1 % cream Apply topically to the affected and surrounding areas of skin as directed 2 (Two) Times a Day. 85 g 1    eplerenone (INSPRA) 50 MG tablet Take 1 tablet by mouth Daily. 90  tablet 3    ezetimibe (ZETIA) 10 MG tablet Take 1 tablet by mouth Daily. (Patient not taking: Reported on 12/30/2024) 90 tablet 3    ezetimibe (ZETIA) 10 MG tablet Take 1 tablet by mouth daily. 90 tablet 3    glimepiride (AMARYL) 4 MG tablet Take 1 tablet by mouth 2 (two) times daily with meals. 90 tablet 1    HYDROcodone-acetaminophen (NORCO) 5-325 MG per tablet Take 1 tablet by mouth Every 6 (Six) Hours As Needed for Moderate Pain for up to 10 doses. 10 tablet 0    hydrocortisone 2.5 % ointment Apply a thin layer to affected areas by topical route twice daily. Safe to use on face. 454 g 11    icosapent ethyl (Vascepa) 1 g capsule capsule Take 2 capsules by mouth 2 (Two) Times a Day With Meals. 120 capsule 3    icosapent ethyl (VASCEPA) 1 g capsule capsule Take 1 capsule by mouth 2 (two) times daily with meals. 180 capsule 1    Insulin Degludec FlexTouch 200 UNIT/ML solution pen-injector Inject 38 Units under the skin into the appropriate area as directed Daily. 15 mL 5    Insulin Degludec FlexTouch 200 UNIT/ML solution pen-injector Inject 48 Units under the skin into the appropriate area as directed Daily. 15 mL 5    Insulin Pen Needle 32G X 4 MM misc Use 1 each up to 4 (four) times daily. 200 each 5    ketotifen ([employer] Allergy Eye Drops) 0.025 % ophthalmic solution Location: Both Eyes. Instill one drop in each affected eye as needed. 10 mL 11    levothyroxine (SYNTHROID, LEVOTHROID) 50 MCG tablet Take 1 tablet by mouth daily. 90 tablet 1    lidocaine (LIDODERM) 5 % Place 1 patch on the skin as directed by provider Daily. Remove & Discard patch within 12 hours or as directed by MD 10 patch 0    metFORMIN ER (GLUCOPHAGE-XR) 500 MG 24 hr tablet Take 2 tablets by mouth 2 (Two) Times a Day. 360 tablet 1    metFORMIN ER (GLUCOPHAGE-XR) 500 MG 24 hr tablet Take 2 tablets by mouth 2 (Two) Times a Day. 360 tablet 1    metoprolol succinate XL (Toprol XL) 50 MG 24 hr tablet Take 1 tablet by mouth 2 (Two) Times a Day. 180  tablet 1    mometasone (ELOCON) 0.1 % ointment Apply sparingly two times per day to the affected areas on the ears and the Umbilicus and patch like areas on the trunk and extremities 45 g 0    multivitamin (THERAGRAN) tablet tablet Take 1 tablet by mouth Daily.      mupirocin (BACTROBAN) 2 % ointment Apply to affected area twice daily to open wounds. Use with hydrocortisone 22 g 2    mupirocin (BACTROBAN) 2 % ointment Apply topically to the infection/biopsy sites as directed 2 (Two) Times a Day. 15 g 1    naloxone (NARCAN) 4 MG/0.1ML nasal spray Call 911. Don't prime. [place] in 1 nostril for overdose. Repeat in 2-3 minutes in other nostril if no or minimal breathing/responsiveness. 2 each 0    olmesartan (Benicar) 40 MG tablet Take 1 tablet by mouth Daily. 90 tablet 1    pimecrolimus (ELIDEL) 1 % cream Use twice daily to areas of flare. 60 g 5    rosuvastatin (CRESTOR) 40 MG tablet Take 1 tablet by mouth Every Night. 90 tablet 3    rosuvastatin (CRESTOR) 40 MG tablet Take 1 tablet by mouth nightly. 90 tablet 3    tacrolimus (PROTOPIC) 0.1 % ointment Apply twice daily to affected area 100 g 11    terazosin (HYTRIN) 5 MG capsule Take 1 capsule by mouth Every Night.      thiamine (VITAMIN B1) 100 MG tablet Take 1 tablet by mouth.      torsemide (DEMADEX) 10 MG tablet Take 1 tablet by mouth Daily. 90 tablet 3    traMADol (ULTRAM) 50 MG tablet Take 1 tablet by mouth Every 8 (Eight) Hours As Needed for Moderate Pain. 9 tablet 0         PROCEDURES  Procedures            PROGRESS, DATA ANALYSIS, CONSULTS, AND MEDICAL DECISION MAKING  All labs have been independently interpreted by me.  All radiology studies have been reviewed by me. All EKG's have been independently viewed and interpreted by me.  Discussion below represents my analysis of pertinent findings related to patient's condition, differential diagnosis, treatment plan and final disposition.    Differential diagnosis includes but is not limited to:  Lumbar  radiculopathy  Herpes zoster  Muscle strain  Ureteral calculus  Appendicitis      Clinical Scores:                  ED Course as of 03/09/25 2040   Sun Mar 09, 2025   1611 Glucose(!): 246 [JR]   1612 Lipase(!): 182 [JR]   1612 WBC: 7.84 [JR]   1612 Lactate(!!): 3.6 [JR]   1612 ALT (SGPT): 40 [JR]   1612 AST (SGOT): 32 [JR]   1612 WBC, UA: 0-2 [JR]   1612 RBC, UA: 0-2 [JR]   1612 I reviewed recent PCP visit from 1/22/2025 where patient was seen in follow-up for type 2 diabetes, hyperlipidemia, CKD 2, hypothyroidism. [JR]   1652 RBC, UA: 0-2 [JR]   1912 CT abdomen pelvis demonstrates a right renal calculus that is nonobstructing.  There is no acute pathology identified [JR]   2037 Patient reassessed and informed of reassuring imaging and laboratory results.  I explained that I think his pain is most likely related to a right L1 lumbar radiculopathy.  He does not have any indication for emergent MRI at this time.  He is not a good candidate for oral steroids given his poorly controlled diabetes.  Alternatively I recommended a trial of Tylenol, tramadol, Flexeril, lidocaine patches, with outpatient referral to neurosurgery spine, possible epidural steroid injection if symptoms persist.  I did explain return precautions. [JR]   2039 Serum lactate was mildly elevated and spontaneously improved from 3.6 initially to 2.7 on repeat.  He has no infectious symptoms, no leukocytosis.  Etiology is unclear however I do not think that he has infection. [JR]      ED Course User Index  [JR] Wilber Roe MD             AS OF 20:40 EDT VITALS:    BP - 120/63  HR - 66  TEMP - 96.7 °F (35.9 °C) (Tympanic)  O2 SATS - 95%    COMPLEXITY OF CARE        Chronic or social conditions impacting patient care (Social Determinants of Health):     DIAGNOSIS  Final diagnoses:   Acute right lumbar radiculopathy           DISPOSITION  DISCHARGE    Patient discharged in stable condition.    Reviewed implications of results, diagnosis, meds,  responsibility to follow up, warning signs and symptoms of possible worsening, potential complications and reasons to return to ER.    Patient/Family voiced understanding of above instructions.    Discussed plan for discharge, as there is no emergent indication for admission. Patient referred to primary care provider for BP management due to today's BP. Pt/family is agreeable and understands need for follow up and repeat testing.  Pt is aware that discharge does not mean that nothing is wrong but it indicates no emergency is present that requires admission and they must continue care with follow-up as given below or physician of their choice.     FOLLOW-UP  Rupinder Link APRN  [id number] [id number]  [phone]    Schedule an appointment as soon as possible for a visit       Malik Jones MD  [id number] [id number] [phone]    Schedule an appointment as soon as possible for a visit            Medication List        New Prescriptions      cyclobenzaprine 10 MG tablet  Commonly known as: FLEXERIL  Take 1 tablet by mouth 3 (Three) Times a Day As Needed for Muscle Spasms.     lidocaine 5 %  Commonly known as: LIDODERM  Place 1 patch on the skin as directed by provider Daily. Remove & Discard patch within 12 hours or as directed by MD     traMADol 50 MG tablet  Commonly known as: ULTRAM  Take 1 tablet by mouth Every 8 (Eight) Hours As Needed for Moderate Pain.            Changed      acetaminophen 500 MG tablet  Commonly known as: TYLENOL  Take 2 tablets by mouth Every 8 (Eight) Hours As Needed for Mild Pain.  What changed:   medication strength  how much to take  when to take this  reasons to take this               Where to Get Your Medications        These medications were sent to [facility]  [address] [address]      Hours: Monday to Friday 7 AM to 6 PM, Saturday & Sunday 8 AM to 4:30 PM (Closed 12 PM to 12:30 PM) Phone:  [phone]   acetaminophen 500 MG tablet  cyclobenzaprine 10 MG tablet  lidocaine 5 %  traMADol 50 MG tablet             Prescription drug monitoring program review: YOHANA reviewed by Wilber Roe MD   N/A and YOHANA query complete and reviewed. Treatment plan to include limited course of prescribed controlled substance. Risks including addiction, benefits, and alternatives presented to patient.      Please note that portions of this document were completed with a voice recognition program.    Note Disclaimer: At [facility], we believe that sharing information builds trust and better relationships. You are receiving this note because you recently visited [facility]. It is possible you will see health information before a provider has talked with you about it. This kind of information can be easy to misunderstand. To help you fully understand what it means for your health, we urge you to discuss this note with your provider.         Wilber Roe MD  03/09/25 0910

## 2025-03-28 DIAGNOSIS — E78.1 HYPERTRIGLYCERIDEMIA: ICD-10-CM

## 2025-03-28 RX ORDER — ICOSAPENT ETHYL 1 G/1
2 CAPSULE ORAL 2 TIMES DAILY WITH MEALS
Qty: 120 CAPSULE | Refills: 1 | Status: SHIPPED | OUTPATIENT
Start: 2025-03-28

## 2025-04-01 ENCOUNTER — OFFICE VISIT (OUTPATIENT)
Dept: ORTHOPEDIC SURGERY | Facility: CLINIC | Age: 70
End: 2025-04-01
Payer: MEDICARE

## 2025-04-01 VITALS — HEIGHT: 74 IN | BODY MASS INDEX: 35.34 KG/M2 | TEMPERATURE: 97.5 F | WEIGHT: 275.4 LBS

## 2025-04-01 DIAGNOSIS — M16.11 PRIMARY OSTEOARTHRITIS OF RIGHT HIP: ICD-10-CM

## 2025-04-01 DIAGNOSIS — R52 PAIN: Primary | ICD-10-CM

## 2025-04-01 DIAGNOSIS — M54.16 LUMBAR RADICULOPATHY: ICD-10-CM

## 2025-04-01 PROCEDURE — 73502 X-RAY EXAM HIP UNI 2-3 VIEWS: CPT | Performed by: NURSE PRACTITIONER

## 2025-04-01 PROCEDURE — 99213 OFFICE O/P EST LOW 20 MIN: CPT | Performed by: NURSE PRACTITIONER

## 2025-04-01 NOTE — PROGRESS NOTES
Patient: Amrik Trimble III  YOB: 1955 69 y.o. male  Medical Record Number: [id number]    Chief Complaint:   Chief Complaint   Patient presents with    Right Hip - Follow-up, Pain       History of Present Illness:Amrik Trimble III is a 69 y.o. male who presents for follow-up of right hip/flank pain.  Patient is a known patient to our practice and has known advanced hip osteoarthritis.  We are waiting on him to get his A1c under 8 before discussing surgical treatment options.  Patient reports however last month he was seen at the emergency department for right flank pain.  States that this is in his lumbar spine region.  Had severe muscle spasms and sharp stabbing pain.  Patient reports that he was seen treated and evaluated by the emergency department and was diagnosed with a lumbar radiculopathy.  Patient states that he was instructed to follow-up with orthopedics for further evaluation.    Allergies: No Known Allergies    Medications:   Current Outpatient Medications   Medication Sig Dispense Refill    Accu-Chek Guide test strip       acetaminophen (TYLENOL) 500 MG tablet Take 2 tablets by mouth Every 8 (Eight) Hours As Needed for Mild Pain. 60 tablet 0    allopurinol (ZYLOPRIM) 100 MG tablet Take 1 tablet by mouth Daily. 90 tablet 3    aspirin 81 MG EC tablet Take 1 tablet by mouth.      Cholecalciferol 50 MCG (2000 UT) capsule Take 1 capsule by mouth Daily.      Cinnamon 500 MG capsule Take 2 capsules by mouth.      cloNIDine (CATAPRES) 0.1 MG tablet Take 1 tablet by mouth Every Morning AND 1 tablet Every Evening AND 1 tablet every night at bedtime. 270 tablet 3    clotrimazole-betamethasone (LOTRISONE) 1-0.05 % cream Apply a thin layer to affected ears once daily for 5 days in a row, then for reoccurrence, use once daily for 1-2 days 45 g 1    Coenzyme Q10 200 MG capsule Take 1 tablet by mouth Daily.      cyclobenzaprine (FLEXERIL) 10 MG tablet Take 1 tablet by mouth 3 (Three) Times a Day As  Needed for Muscle Spasms. 30 tablet 0    dapagliflozin Propanediol 10 MG tablet Take 1 tablet by mouth daily. 90 tablet 1    desonide (DESOWEN) 0.05 % ointment Apply sparingly to the affected area on the scaly areas on the periorbital area and some on the eyebrow lid area as directed 2 (Two) Times a Day. 60 g 1    Easy Touch Lancets 30G/Twist misc       econazole nitrate (SPECTAZOLE) 1 % cream Apply topically to the affected and surrounding areas of skin as directed 2 (Two) Times a Day. 85 g 1    eplerenone (INSPRA) 50 MG tablet Take 1 tablet by mouth Daily. 90 tablet 3    ezetimibe (ZETIA) 10 MG tablet Take 1 tablet by mouth Daily. 90 tablet 3    glimepiride (AMARYL) 4 MG tablet Take 1 tablet by mouth 2 (two) times daily with meals. 90 tablet 1    HYDROcodone-acetaminophen (NORCO) 5-325 MG per tablet Take 1 tablet by mouth Every 6 (Six) Hours As Needed for Moderate Pain for up to 10 doses. 10 tablet 0    hydrocortisone 2.5 % ointment Apply a thin layer to affected areas by topical route twice daily. Safe to use on face. 454 g 11    icosapent ethyl (VASCEPA) 1 g capsule capsule Take 1 capsule by mouth 2 (two) times daily with meals. 180 capsule 1    icosapent ethyl (Vascepa) 1 g capsule capsule Take 2 capsules by mouth 2 (Two) Times a Day With Meals. 120 capsule 1    Insulin Degludec FlexTouch 200 UNIT/ML solution pen-injector Inject 38 Units under the skin into the appropriate area as directed Daily. 15 mL 5    Insulin Degludec FlexTouch 200 UNIT/ML solution pen-injector Inject 48 Units under the skin into the appropriate area as directed Daily. 15 mL 5    Insulin Pen Needle 32G X 4 MM misc Use 1 each up to 4 (four) times daily. 200 each 5    ketotifen ([employer] Allergy Eye Drops) 0.025 % ophthalmic solution Location: Both Eyes. Instill one drop in each affected eye as needed. 10 mL 11    levothyroxine (SYNTHROID, LEVOTHROID) 50 MCG tablet Take 1 tablet by mouth daily. 90 tablet 1    lidocaine (LIDODERM) 5 % Place 1  patch on the skin as directed by provider Daily. Remove & Discard patch within 12 hours or as directed by MD 10 patch 0    metFORMIN ER (GLUCOPHAGE-XR) 500 MG 24 hr tablet Take 2 tablets by mouth 2 (Two) Times a Day. 360 tablet 1    metoprolol succinate XL (Toprol XL) 50 MG 24 hr tablet Take 1 tablet by mouth 2 (Two) Times a Day. 180 tablet 1    mometasone (ELOCON) 0.1 % ointment Apply sparingly two times per day to the affected areas on the ears and the Umbilicus and patch like areas on the trunk and extremities 45 g 0    multivitamin (THERAGRAN) tablet tablet Take 1 tablet by mouth Daily.      mupirocin (BACTROBAN) 2 % ointment Apply to affected area twice daily to open wounds. Use with hydrocortisone 22 g 2    naloxone (NARCAN) 4 MG/0.1ML nasal spray Call 911. Don't prime. [place] in 1 nostril for overdose. Repeat in 2-3 minutes in other nostril if no or minimal breathing/responsiveness. 2 each 0    olmesartan (Benicar) 40 MG tablet Take 1 tablet by mouth Daily. 90 tablet 1    pimecrolimus (ELIDEL) 1 % cream Use twice daily to areas of flare. 60 g 5    rosuvastatin (CRESTOR) 40 MG tablet Take 1 tablet by mouth Every Night. 90 tablet 3    tacrolimus (PROTOPIC) 0.1 % ointment Apply twice daily to affected area 100 g 11    terazosin (HYTRIN) 5 MG capsule Take 1 capsule by mouth Every Night.      thiamine (VITAMIN B1) 100 MG tablet Take 1 tablet by mouth.      torsemide (DEMADEX) 10 MG tablet Take 1 tablet by mouth Daily. 90 tablet 3     No current facility-administered medications for this visit.         The following portions of the patient's history were reviewed and updated as appropriate: allergies, current medications, past family history, past medical history, past social history, past surgical history and problem list.    Review of Systems:   Pertinent positives/negative listed in HPI above    Physical Exam:   Vitals:    04/01/25 1306   Temp: 97.5 °F (36.4 °C)   TempSrc: Temporal   Weight: 125 kg (275 lb 6.4  "oz)   Height: 188 cm (74.02\")   PainSc: 1    PainLoc: Hip       General: A and O x 3, ASA, NAD    Hip:  right                          LEG ALIGNMENT:     Neutral                              LEG LENGTH DISCREPANCY   :    none                          GAIT:     Antalgic                          SKIN:     No abnormality                          RANGE OF MOTION:     Limited by joint irritability                          STRENGTH:     Limited by joint irratibility                          DISTAL PULSES:    Paplable                          DISTAL SENSATION :   Intact                          LYMPHATICS:     No   lymphadenopathy                          OTHER:          +   Stinchfeld test                                                  -    log roll                                                  -   Tenderness to palpation trochanteric bursa                Radiology:    Xrays 2views right hip (AP bilateral hips and lateral hip) taken previously demonstrating moderate joint space narrowing with periarticular osteophytes present.  Comparison views: No new x-rays taken today     Right hip MRI:  Report shows degenerative changes with a labral tear     Assessment/Plan: Right Hip pain/Primary osteoarthritis right hip/lumbar radiculopathy       Treatment options as well as imaging results were discussed in detail with the patient.  Based off the patient's current symptoms it seems like he is having pain into the lumbar spine region mostly in the right paraspinous muscle area.  He is tender to palpation to this area.  I do believe that the patient would likely benefit from being evaluated by orthopedic spine specialist.  Is scheduled to see a APRN with neurosurgery in 3 to 4 weeks however I believe we can accommodate him quicker by seeing our orthopedic spine specialist.  I will see if [facility] has available appointments to see him quicker and get a faster diagnosis.      Michele Hernández, APRN  4/1/2025  "

## 2025-04-02 ENCOUNTER — OFFICE VISIT (OUTPATIENT)
Dept: ORTHOPEDIC SURGERY | Facility: CLINIC | Age: 70
End: 2025-04-02
Payer: COMMERCIAL

## 2025-04-02 VITALS — TEMPERATURE: 97.1 F | HEIGHT: 74 IN | BODY MASS INDEX: 35.29 KG/M2 | WEIGHT: 275 LBS

## 2025-04-02 DIAGNOSIS — M51.360 DEGENERATION OF INTERVERTEBRAL DISC OF LUMBAR REGION WITH DISCOGENIC BACK PAIN: Primary | ICD-10-CM

## 2025-04-02 DIAGNOSIS — M54.16 LUMBAR RADICULOPATHY: ICD-10-CM

## 2025-04-02 PROCEDURE — 99214 OFFICE O/P EST MOD 30 MIN: CPT | Performed by: NURSE PRACTITIONER

## 2025-04-02 RX ORDER — CYCLOBENZAPRINE HCL 10 MG
10 TABLET ORAL 3 TIMES DAILY PRN
Qty: 30 TABLET | Refills: 1 | Status: SHIPPED | OUTPATIENT
Start: 2025-04-02

## 2025-04-02 NOTE — PROGRESS NOTES
Patient Name: Amrik Trimble III   YOB: 1955  Referring Primary Care Physician: Rupinder Link APRN      Chief Complaint:    Chief Complaint   Patient presents with    Lumbar Spine - Initial Evaluation, Pain        Previous Treatment:   [facility] per [facility]    ED:   03/09/2025 -  ED     PT for LBP/Hip pain? No     MRI of L-Spine? No       Back Pain  This is a new problem. The current episode started 1 to 4 weeks ago. The problem occurs intermittently. The problem has been worse since onset. Pain location: Rt lower paraspinal referring into rt hip, rt groin. The quality of the pain is described as stabbing (Sharp). The pain is at a severity of 10/10. The pain is severe. Exacerbated by: Getting in/out car, Stairs. Stiffness is present All day. Pertinent negatives include no leg pain, numbness, tingling or weakness. He has tried NSAIDs and muscle relaxant (traMADol, lido patch) for the symptoms. The treatment provided moderate relief.   Additional comments: 11/02/2022 - [facility] - Kiran Park MD            HPI:  Amrik Trimble III is a 69 y.o. male who presents to [facility] for evaluation of above complaints.  Recently evaluated by KAI Mayfield for the right hip pain.  He has known right hip osteoarthritis and has discussed hip arthroplasty, but currently A1c is not well enough controlled.  He was recently in the emergency room for right flank pain referring into the hip and groin.  There was some concern for radiculopathy and he was referred to neurosurgery.  He has an appointment in 2 weeks with KAI Lara.  When he was seen more recently in this office for his hip, he asked for an earlier appointment so we are seeing him today to sort out the question of radiculopathy.  He says about 3 weeks ago he was on the toilet he went to get up and had severe pain and spasms from the right low back and buttock around the lateral hip and into  the groin.  He was previously evaluated by an orthopedist about 2 or 3 years ago and had an intra-articular hip injection only gave him about 2 days of relief.  He denies any pain, numbness or weakness down lower extremities.  He is accompanied by his wife today.  This is an established patient to the practice, new to me.  Prior pertinent records were reviewed.    PFSH:  See attached    ROS: As per HPI, otherwise negative    Objective:      69 y.o. male  Body mass index is 35.31 kg/m²., 125 kg (275 lb), @HT@  Vitals:    04/02/25 1321   Temp: 97.1 °F (36.2 °C)     Pain Score    04/02/25 1321   PainSc: 10-Worst pain ever  Comment: at worse   PainLoc: Back            Spine Musculoskeletal Exam    Gait    Trendelenburg: right    Strength    Thoracolumbar    Thoracolumbar motor exam is normal.       Right      Quadriceps are affected by pain.       Hip flexion is affected by pain.       Sensory    Thoracolumbar    Thoracolumbar sensation is normal.    Reflexes    Right      Quadriceps: hyporeflexic      Achilles: hyporeflexic     Left      Quadriceps: hyporeflexic      Achilles: hyporeflexic    Special Tests    Thoracolumbar      Right      CINDY test: positive      SLR: no back or leg pain      Left      SLR: no back or leg pain    Spine special tests additional comments: Positive FADIR and logroll on the right    General      Constitutional: well-developed and well-nourished    Scleral icterus: no    Labored breathing: no    Psychiatric: normal mood and affect and no acute distress    Neurological: alert and oriented x3    Skin: intact        IMAGING:     Indication: pain related symptoms,  Views: 2V AP&LAT lumbar  Findings: Multilevel degenerative disc and facet changes, multiple anterior osteophytes, no subluxation or fractures noted  Comparison views: Lumbar plain films 09/20/2017 are not available for direct review from [place] but report moderate degenerative spondylosis of the lumbar spine, no acute bony  abnormalities.      Official radiology interpretation will follow and be available in the patient medical records. Patient will be notified of any pertinent discrepancies.    Assessment:           Diagnoses and all orders for this visit:    1. Degeneration of intervertebral disc of lumbar region with discogenic back pain (Primary)    2. Lumbar radiculopathy  -     MRI Lumbar Spine Without Contrast; Future    Other orders  -     cyclobenzaprine (FLEXERIL) 10 MG tablet; Take 1 tablet by mouth 3 (Three) Times a Day As Needed for Muscle Spasms.  Dispense: 30 tablet; Refill: 1             Plan:  His symptoms and exam point more towards hip pathology with positive FADIR and logroll and negative straight leg raise test, however he does have significant degenerative change and spondylosis of the lower lumbar spine.  He is currently work on getting his A1c below 8 so we can proceed with right AREN but to rule out any nerve root impingement will get lumbar MRI without contrast.  Will call him with those results.  He may not be a good candidate for epidural injections as he is struggling to get the A1c below 8.  If there is anything of surgical concern we will have him follow-up with neurosurgery, otherwise he will follow-up with Dr. Miguel once A1c is acceptable.  He was given a home exercise program for low back today.  We did discuss going to physical therapy, but he does not feel he could tolerate it currently with the amount of pain he has with activity.  He does have a stationary bike at home and we discussed utilizing this without resistance for exercise.  He has been getting benefit from cyclobenzaprine prescribed at the ER, will refill that for him today.  He asks about meloxicam but with his cardiac history, will defer to PCP or cardiology.    Return for Call with results.    EMR Dragon/Transcription Disclaimer:   Much of this encounter note is an electronic transcription/translation of spoken language to printed text.  The electronic translation of spoken language may permit erroneous, or at times, nonsensical words or phrases to be inadvertently transcribed; Although I have reviewed the note for such errors, some may still exist.  Red flags have been discussed at this or previous visits to include but not limited weakness in extremities, worsening pain that does not respond to conservative treatment and bowel or bladder dysfunction. These are reasons to present to ER and patient has been informed.    The diagnosis(es), natural history, pathophysiology and treatment for diagnosis(es) were discussed. Opportunity given and questions answered. Biomechanics of pertinent body areas discussed.    EXERCISES:  Advice on benefits of, and types of regular/moderate exercise pertaining to diagnosis.  Continue HEP. For back or neck pain, recommend pilates and or yoga as tolerated. Generally it is best to start any new exercise under the guidance of a  or therapist.   MEDICATIONS:  When prescribe, the risks, benefits, warnings,side effects and alternatives of medications discussed. Advised against long term use of narcotics.   PAIN CONTROL:  Cold, heat, OTC lidocaine patches and/or ointment as needed. Avoid direct skin contact with ice. Ice 15-20 minutes 3-4 times daily as needed. For SI joint pain, recommend ice bath in water about 50 degrees for 5 consecutive days, add ice slowly to help with adjustment and may cover with warm towel or robe to help with cold tolerance. If using lidocaine, do not apply heat in conjunction as this can cause a burn.   MEDICAL RECORDS reviewed from other provider(s) for past and current medical history pertinent to this visit..

## 2025-04-08 ENCOUNTER — TELEPHONE (OUTPATIENT)
Dept: NEUROSURGERY | Facility: CLINIC | Age: 70
End: 2025-04-08
Payer: COMMERCIAL

## 2025-04-08 ENCOUNTER — HOSPITAL ENCOUNTER (OUTPATIENT)
Dept: MRI IMAGING | Facility: HOSPITAL | Age: 70
Discharge: HOME | End: 2025-04-08
Admitting: NURSE PRACTITIONER
Payer: COMMERCIAL

## 2025-04-08 DIAGNOSIS — M54.16 LUMBAR RADICULOPATHY: ICD-10-CM

## 2025-04-08 PROCEDURE — 72148 MRI LUMBAR SPINE W/O DYE: CPT

## 2025-04-08 NOTE — TELEPHONE ENCOUNTER
Spoke with pt, informed him that they will no be charged for XR appt which was scheduled 04/07/2025. Pt informed me that he was having a MRI completed on 04/08/2025 and will reschedule when needed

## 2025-04-08 NOTE — TELEPHONE ENCOUNTER
Caller: TRI     Relationship: WIFE    Best call back number: [phone]     What is the best time to reach you: ANYTIME    Who are you requesting to speak with (clinical staff, provider,  specific staff member): BARBARA REYNOLDS    What was the call regarding: PT'S WIFE CALLED STATES THEY HAD CALLED TO CANCEL NEW PT APPT FOR 4/22 AND DID NOT SEE THE XR APPT FOR 4/7 UNTIL YESTERDAY EVENING - PT WAS UNAWARE OF THE APPT PRIOR AND WOULD HAVE CANCELLED HAD THEY KNOWN - PT WANTS TO BE SURE THEY WON'T BE CHARGED FOR A NO SHOW AS THEY WERE UNAWARE OF THE APPT

## 2025-05-29 DIAGNOSIS — E78.1 HYPERTRIGLYCERIDEMIA: ICD-10-CM

## 2025-05-30 RX ORDER — ICOSAPENT ETHYL 1 G/1
2 CAPSULE ORAL 2 TIMES DAILY WITH MEALS
Qty: 180 CAPSULE | Refills: 1 | Status: SHIPPED | OUTPATIENT
Start: 2025-05-30

## 2025-05-30 NOTE — TELEPHONE ENCOUNTER
Rx Refill Note  Requested Prescriptions     Pending Prescriptions Disp Refills    icosapent ethyl (Vascepa) 1 g capsule capsule 120 capsule 1     Sig: Take 2 capsules by mouth 2 (Two) Times a Day With Meals.      Last office visit with prescribing clinician: 12/30/2024   Last telemedicine visit with prescribing clinician: Visit date not found   Next office visit with prescribing clinician: 7/2/2025                         Would you like a call back once the refill request has been completed: [] Yes [] No    If the office needs to give you a call back, can they leave a voicemail: [] Yes [] No    Jonathan Lo MA  05/30/25, 10:40 EDT

## 2025-06-18 RX ORDER — OLMESARTAN MEDOXOMIL 40 MG/1
40 TABLET ORAL DAILY
Qty: 90 TABLET | Refills: 1 | Status: SHIPPED | OUTPATIENT
Start: 2025-06-18

## 2025-06-23 ENCOUNTER — OFFICE VISIT (OUTPATIENT)
Dept: FAMILY MEDICINE CLINIC | Facility: CLINIC | Age: 70
End: 2025-06-23
Payer: COMMERCIAL

## 2025-06-23 VITALS
BODY MASS INDEX: 35.29 KG/M2 | WEIGHT: 275 LBS | HEIGHT: 74 IN | HEART RATE: 77 BPM | TEMPERATURE: 96.9 F | RESPIRATION RATE: 16 BRPM | OXYGEN SATURATION: 98 % | SYSTOLIC BLOOD PRESSURE: 122 MMHG | DIASTOLIC BLOOD PRESSURE: 60 MMHG

## 2025-06-23 DIAGNOSIS — H81.399 PERIPHERAL VERTIGO, UNSPECIFIED LATERALITY: Primary | ICD-10-CM

## 2025-06-23 PROCEDURE — 99214 OFFICE O/P EST MOD 30 MIN: CPT | Performed by: STUDENT IN AN ORGANIZED HEALTH CARE EDUCATION/TRAINING PROGRAM

## 2025-06-23 NOTE — PROGRESS NOTES
Office Note     Name: Amrik Trimble III    : 1955     MRN: [id number]     Chief Complaint  Dizziness (Inner ear since thursday)    Subjective     History of Present Illness:  Amrik Trimble III is a 69 y.o. male     History of Present Illness  The patient is a 69-year-old male with a medical history significant for coronary artery disease, hypertension, mild aortic stenosis, left carotid bruit, hypothyroidism, obstructive sleep apnea, type 2 diabetes mellitus, and obesity. He presents today with complaints of vertigo persisting for the past four days.    Vertigo  - Experiencing vertigo upon standing or transitioning from lying on his right side to his left  - Describes the sensation as rotational vertigo  - Symptom onset was noted on Thursday  - Initially suspected a correlation with his granddaughter's recent illness  - Now considers a potential inner ear etiology  - Vertigo was severe enough to induce nausea on one occasion  - Reports ataxia, describing a sensation of unsteadiness while ambulating  - Unable to maintain a straight path  - Attempted vestibular exercises to alleviate the vertigo but has been limited by a hip issue  - Previously consulted an otolaryngologist and underwent allergy skin testing  - Observed occasional nystagmus during episodes of vertigo    Supplemental information: Denies any episodes of syncope, chest pain, or dyspnea. He has an upcoming appointment for laboratory work. He has a pre-existing sensorineural hearing loss, which he does not believe has worsened recently. He denies tinnitus but mentions mild sinus pressure.       Past Medical History:   Past Medical History:   Diagnosis Date    Age-related cataract of both eyes 2017    Ankle sprain     Aortic stenosis, mild 2021    Arthritis 2020    Formatting of this note might be different from the original. Added automatically from request for surgery [id number]    CAD (coronary artery disease) 05/10/2013     CTS (carpal tunnel syndrome)     Gout 04/24/2023    HTN (hypertension) 05/10/2013    Hyperkalemia 11/18/2021    Hyperlipidemia 05/10/2013    Knee sprain     Left carotid bruit 03/08/2021    Low back strain     Non-ST elevation MI (NSTEMI) 02/01/2012    Obesity 07/30/2014    Obstructive sleep apnea 04/24/2023    Formatting of this note might be different from the original. compliant    Osteoarthritis of both knees 02/10/2022    Periarthritis of shoulder     Proteinuria 02/15/2016    S/P PTCA (percutaneous transluminal coronary angioplasty) 07/30/2014    Smoking hx 05/10/2013    Stage 3 chronic kidney disease 11/18/2021    Tear of medial meniscus of knee 12/22/2015    Formatting of this note might be different from the original. Added automatically from request for surgery [id number]    Tear of meniscus of knee     Type 2 diabetes mellitus with diabetic chronic kidney disease 11/18/2021    Uncontrolled type 2 diabetes mellitus with hyperglycemia 01/02/2015    Vitamin D deficiency 03/17/2014    Wrist sprain        Past Surgical History:   Past Surgical History:   Procedure Laterality Date    CORONARY ANGIOPLASTY      with stents    ELBOW ARTHROPLASTY Right     ELBOW PROCEDURE      HAND SURGERY Left     INGUINAL HERNIA REPAIR Bilateral     KIDNEY STONE SURGERY      KNEE SURGERY      MENISCECTOMY Bilateral     ROTATOR CUFF REPAIR Left     ROTATOR CUFF REPAIR Right     x 2    SHOULDER SURGERY      THUMB ARTHROSCOPY Left     re-attachement    WRIST SURGERY         Immunizations:   Immunization History   Administered Date(s) Administered    COVID-19 (MODERNA) 1st,2nd,3rd Dose Monovalent 01/19/2021, 02/12/2021    COVID-19 (MODERNA) Monovalent Original Booster 11/27/2021    Fluad Quad 65+ 10/16/2022    Fluzone (or Fluarix & Flulaval for VFC) >6mos 09/17/2018, 09/20/2019, 10/10/2021    Fluzone High-Dose 65+yrs 12/08/2023    Influenza Injectable Mdck Pf Quad 09/19/2020    Influenza Split Preservative Free ID 10/13/2021     Influenza, Unspecified 10/16/2022, 09/20/2023, 11/11/2024    Pneumococcal Conjugate 13-Valent (PCV13) 09/20/2019    Pneumococcal Polysaccharide (PPSV23) 01/28/2022    Tdap 09/20/2019    Zostavax 09/20/2017        Medications:     Current Outpatient Medications:     Accu-Chek Guide test strip, , Disp: , Rfl:     acetaminophen (TYLENOL) 500 MG tablet, Take 2 tablets by mouth Every 8 (Eight) Hours As Needed for Mild Pain., Disp: 60 tablet, Rfl: 0    allopurinol (ZYLOPRIM) 100 MG tablet, Take 1 tablet by mouth Daily., Disp: 90 tablet, Rfl: 3    aspirin 81 MG EC tablet, Take 1 tablet by mouth., Disp: , Rfl:     Cholecalciferol 50 MCG (2000 UT) capsule, Take 1 capsule by mouth Daily., Disp: , Rfl:     Cinnamon 500 MG capsule, Take 2 capsules by mouth., Disp: , Rfl:     cloNIDine (CATAPRES) 0.1 MG tablet, Take 1 tablet by mouth Every Morning AND 1 tablet Every Evening AND 1 tablet every night at bedtime., Disp: 270 tablet, Rfl: 3    clotrimazole-betamethasone (LOTRISONE) 1-0.05 % cream, Apply a thin layer to affected ears once daily for 5 days in a row, then for reoccurrence, use once daily for 1-2 days, Disp: 45 g, Rfl: 1    Coenzyme Q10 200 MG capsule, Take 1 tablet by mouth Daily., Disp: , Rfl:     cyclobenzaprine (FLEXERIL) 10 MG tablet, Take 1 tablet by mouth 3 (Three) Times a Day As Needed for Muscle Spasms., Disp: 30 tablet, Rfl: 1    dapagliflozin Propanediol 10 MG tablet, Take 1 tablet by mouth daily., Disp: 90 tablet, Rfl: 1    dapagliflozin Propanediol 10 MG tablet, Take 1 tablet by mouth Daily., Disp: 90 tablet, Rfl: 1    desonide (DESOWEN) 0.05 % ointment, Apply sparingly to the affected area on the scaly areas on the periorbital area and some on the eyebrow lid area as directed 2 (Two) Times a Day., Disp: 60 g, Rfl: 1    Easy Touch Lancets 30G/Twist misc, , Disp: , Rfl:     econazole nitrate (SPECTAZOLE) 1 % cream, Apply topically to the affected and surrounding areas of skin as directed 2 (Two) Times a Day.,  Disp: 85 g, Rfl: 1    eplerenone (INSPRA) 50 MG tablet, Take 1 tablet by mouth Daily., Disp: 90 tablet, Rfl: 3    ezetimibe (ZETIA) 10 MG tablet, Take 1 tablet by mouth Daily., Disp: 90 tablet, Rfl: 3    glimepiride (AMARYL) 4 MG tablet, Take 1 tablet by mouth 2 (two) times daily with meals., Disp: 90 tablet, Rfl: 1    glimepiride (AMARYL) 4 MG tablet, Take 1 tablet by mouth 2 (Two) Times a Day With Meals., Disp: 90 tablet, Rfl: 1    HYDROcodone-acetaminophen (NORCO) 5-325 MG per tablet, Take 1 tablet by mouth Every 6 (Six) Hours As Needed for Moderate Pain for up to 10 doses., Disp: 10 tablet, Rfl: 0    hydrocortisone 2.5 % ointment, Apply a thin layer to affected areas by topical route twice daily. Safe to use on face., Disp: 454 g, Rfl: 11    icosapent ethyl (Vascepa) 1 g capsule capsule, Take 2 capsules by mouth 2 (Two) Times a Day With Meals., Disp: 180 capsule, Rfl: 1    Insulin Degludec FlexTouch 200 UNIT/ML solution pen-injector, Inject 38 Units under the skin into the appropriate area as directed Daily., Disp: 15 mL, Rfl: 5    Insulin Degludec FlexTouch 200 UNIT/ML solution pen-injector, Inject 48 Units under the skin into the appropriate area as directed Daily., Disp: 15 mL, Rfl: 5    Insulin Degludec FlexTouch 200 UNIT/ML solution pen-injector, Inject 54 Units under the skin into the appropriate area as directed Daily., Disp: 16.2 mL, Rfl: 3    Insulin Pen Needle (Pen Needles) 31G X 5 MM misc, Use 1 to Inject under the skin into the appropriate area as directed 4 (Four) Times a Day., Disp: 200 each, Rfl: 5    Insulin Pen Needle 32G X 4 MM misc, Use 1 each up to 4 (four) times daily., Disp: 200 each, Rfl: 5    ketotifen ([employer] Allergy Eye Drops) 0.025 % ophthalmic solution, Location: Both Eyes. Instill one drop in each affected eye as needed., Disp: 10 mL, Rfl: 11    levothyroxine (SYNTHROID, LEVOTHROID) 50 MCG tablet, Take 1 tablet by mouth Daily., Disp: 90 tablet, Rfl: 1    lidocaine (LIDODERM) 5 %, Place  1 patch on the skin as directed by provider Daily. Remove & Discard patch within 12 hours or as directed by MD, Disp: 10 patch, Rfl: 0    metFORMIN ER (GLUCOPHAGE-XR) 500 MG 24 hr tablet, Take 2 tablets by mouth 2 (Two) Times a Day., Disp: 360 tablet, Rfl: 1    metFORMIN ER (GLUCOPHAGE-XR) 500 MG 24 hr tablet, Take 2 tablets by mouth 2 (Two) Times a Day., Disp: 360 tablet, Rfl: 1    metoprolol succinate XL (Toprol XL) 50 MG 24 hr tablet, Take 1 tablet by mouth 2 (Two) Times a Day., Disp: 180 tablet, Rfl: 1    mometasone (ELOCON) 0.1 % ointment, Apply sparingly two times per day to the affected areas on the ears and the Umbilicus and patch like areas on the trunk and extremities, Disp: 45 g, Rfl: 0    multivitamin (THERAGRAN) tablet tablet, Take 1 tablet by mouth Daily., Disp: , Rfl:     mupirocin (BACTROBAN) 2 % ointment, Apply to affected area twice daily to open wounds. Use with hydrocortisone, Disp: 22 g, Rfl: 2    naloxone (NARCAN) 4 MG/0.1ML nasal spray, Call 911. Don't prime. [employer] in 1 nostril for overdose. Repeat in 2-3 minutes in other nostril if no or minimal breathing/responsiveness., Disp: 2 each, Rfl: 0    olmesartan (Benicar) 40 MG tablet, Take 1 tablet by mouth Daily., Disp: 90 tablet, Rfl: 1    pimecrolimus (ELIDEL) 1 % cream, Use twice daily to areas of flare., Disp: 60 g, Rfl: 5    rosuvastatin (CRESTOR) 40 MG tablet, Take 1 tablet by mouth Every Night., Disp: 90 tablet, Rfl: 3    tacrolimus (PROTOPIC) 0.1 % ointment, Apply twice daily to affected area, Disp: 100 g, Rfl: 11    terazosin (HYTRIN) 5 MG capsule, Take 1 capsule by mouth Every Night., Disp: , Rfl:     thiamine (VITAMIN B1) 100 MG tablet, Take 1 tablet by mouth., Disp: , Rfl:     torsemide (DEMADEX) 10 MG tablet, Take 1 tablet by mouth Daily., Disp: 90 tablet, Rfl: 3    Allergies:   No Known Allergies    Family History:   Family History   Problem Relation Age of Onset    Emphysema Mother     No Known Problems Father     No Known Problems  "Sister     No Known Problems Brother     Diabetes Maternal Grandmother     Arthritis Maternal Grandfather     Kidney disease Maternal Grandfather     Glaucoma Paternal Grandmother     No Known Problems Paternal Grandfather        Social History:   Social History     Socioeconomic History    Marital status:    Tobacco Use    Smoking status: Former     Current packs/day: 0.00     Average packs/day: 1.5 packs/day for 41.0 years (61.5 ttl pk-yrs)     Types: Cigarettes     Start date: 1971     Quit date: 2012     Years since quittin.4     Passive exposure: Past    Smokeless tobacco: Never    Tobacco comments:     Quit in ; 1+PPD   Vaping Use    Vaping status: Never Used   Substance and Sexual Activity    Alcohol use: Not Currently     Comment: rarely    Drug use: Never    Sexual activity: Yes     Partners: Female     Birth control/protection: Pill         Objective     Vital Signs  /60 (BP Location: Left arm, Patient Position: Sitting, Cuff Size: Adult)   Pulse 77   Temp 96.9 °F (36.1 °C) (Temporal)   Resp 16   Ht 188 cm (74.02\")   Wt 125 kg (275 lb)   SpO2 98%   BMI 35.29 kg/m²   Estimated body mass index is 35.29 kg/m² as calculated from the following:    Height as of this encounter: 188 cm (74.02\").    Weight as of this encounter: 125 kg (275 lb).          Physical Exam  Constitutional:       Appearance: Normal appearance.   Eyes:      Extraocular Movements: Extraocular movements intact.   Cardiovascular:      Rate and Rhythm: Normal rate.   Pulmonary:      Effort: Pulmonary effort is normal. No respiratory distress.   Neurological:      General: No focal deficit present.      Mental Status: He is alert and oriented to person, place, and time.      Cranial Nerves: No cranial nerve deficit.      Sensory: No sensory deficit.      Comments: Loss of vestibular-ocular reflex when head is turned 30 degrees with quick saccade noted.  Absent skew testing.   Psychiatric:         Mood and " Affect: Mood normal.         Behavior: Behavior normal.         Thought Content: Thought content normal.         Judgment: Judgment normal.          Assessment and Plan     Assessment & Plan  1. Peripheral vertigo: Acute.  - Presents with dizziness, especially with positional changes, and sensation of room spinning since 06/19/2025.  - Neurological assessment reveals signs consistent with peripheral vertigo; no hearing loss or tinnitus noted.  - Discussed BPPV and its pathophysiology, including the displacement of otoliths in the inner ear.  - Referral to ENT specialist for further evaluation and potential Yousif-Hallpike maneuver.  - Vestibular rehabilitation therapy may be considered if symptoms persist.    Follow-up  - Referral to ENT specialist for further evaluation and potential Yousif-Hallpike maneuver.         Follow Up  No follow-ups on file.      I spent 31 minutes caring for Amrik on this date of service. This time includes time spent by me in the following activities:preparing for the visit, reviewing tests, obtaining and/or reviewing a separately obtained history, performing a medically appropriate examination and/or evaluation , counseling and educating the patient/family/caregiver, referring and communicating with other health care professionals , documenting information in the medical record, independently interpreting results and communicating that information with the patient/family/caregiver, and care coordination      Wyatt Miguel MD   [facility]  [address] [address] [address] [phone]    Patient or patient representative verbalized consent for the use of Ambient Listening during the visit with  Wyatt Miguel MD for chart documentation. 6/23/2025  11:08 EDT

## 2025-07-02 ENCOUNTER — OFFICE VISIT (OUTPATIENT)
Dept: FAMILY MEDICINE CLINIC | Facility: CLINIC | Age: 70
End: 2025-07-02
Payer: COMMERCIAL

## 2025-07-02 VITALS
HEART RATE: 70 BPM | SYSTOLIC BLOOD PRESSURE: 110 MMHG | HEIGHT: 74 IN | BODY MASS INDEX: 35.86 KG/M2 | RESPIRATION RATE: 16 BRPM | WEIGHT: 279.4 LBS | OXYGEN SATURATION: 95 % | TEMPERATURE: 98.2 F | DIASTOLIC BLOOD PRESSURE: 60 MMHG

## 2025-07-02 DIAGNOSIS — Z12.11 SCREEN FOR COLON CANCER: ICD-10-CM

## 2025-07-02 DIAGNOSIS — E11.65 UNCONTROLLED TYPE 2 DIABETES MELLITUS WITH HYPERGLYCEMIA: ICD-10-CM

## 2025-07-02 DIAGNOSIS — Z00.00 MEDICARE ANNUAL WELLNESS VISIT, SUBSEQUENT: Primary | ICD-10-CM

## 2025-07-02 DIAGNOSIS — G47.33 OBSTRUCTIVE SLEEP APNEA: ICD-10-CM

## 2025-07-02 DIAGNOSIS — E78.1 HYPERTRIGLYCERIDEMIA: ICD-10-CM

## 2025-07-02 DIAGNOSIS — E78.2 MIXED HYPERLIPIDEMIA: ICD-10-CM

## 2025-07-02 DIAGNOSIS — I10 PRIMARY HYPERTENSION: ICD-10-CM

## 2025-07-02 RX ORDER — AMANTADINE HYDROCHLORIDE 100 MG/1
TABLET ORAL
COMMUNITY
Start: 2025-06-12

## 2025-07-02 RX ORDER — DAPAGLIFLOZIN 10 MG/1
10 TABLET, FILM COATED ORAL DAILY
COMMUNITY
Start: 2025-04-23 | End: 2025-07-02

## 2025-07-02 NOTE — ASSESSMENT & PLAN NOTE
Hypertension is stable and controlled  Continue current treatment regimen.  Blood pressure will be reassessed in 6 months.    Orders:    CBC & Differential; Future

## 2025-07-02 NOTE — PROGRESS NOTES
Subjective   The ABCs of the Annual Wellness Visit  Medicare Wellness Visit      Amrik Trimble III is a 69 y.o. patient who presents for a Medicare Wellness Visit.    The following portions of the patient's history were reviewed and   updated as appropriate: allergies, current medications, past family history, past medical history, past social history, past surgical history, and problem list.    Compared to one year ago, the patient's physical   health is the same.  Compared to one year ago, the patient's mental   health is the same.    Recent Hospitalizations:  He was not admitted to the hospital during the last year.     Current Medical Providers:  Patient Care Team:  Rupinder Link APRN as PCP - General (Family Medicine)    Outpatient Medications Prior to Visit   Medication Sig Dispense Refill    acetaminophen (TYLENOL) 500 MG tablet Take 2 tablets by mouth Every 8 (Eight) Hours As Needed for Mild Pain. 60 tablet 0    allopurinol (ZYLOPRIM) 100 MG tablet Take 1 tablet by mouth Daily. 90 tablet 3    amantadine (SYMMETREL) 100 MG tablet       aspirin 81 MG EC tablet Take 1 tablet by mouth.      Cholecalciferol 50 MCG (2000 UT) capsule Take 1 capsule by mouth Daily.      cloNIDine (CATAPRES) 0.1 MG tablet Take 1 tablet by mouth Every Morning AND 1 tablet Every Evening AND 1 tablet every night at bedtime. 270 tablet 3    clotrimazole-betamethasone (LOTRISONE) 1-0.05 % cream Apply a thin layer to affected ears once daily for 5 days in a row, then for reoccurrence, use once daily for 1-2 days 45 g 1    Coenzyme Q10 200 MG capsule Take 1 tablet by mouth Daily.      cyclobenzaprine (FLEXERIL) 10 MG tablet Take 1 tablet by mouth 3 (Three) Times a Day As Needed for Muscle Spasms. 30 tablet 1    desonide (DESOWEN) 0.05 % ointment Apply sparingly to the affected area on the scaly areas on the periorbital area and some on the eyebrow lid area as directed 2 (Two) Times a Day. 60 g 1    Easy Touch Lancets 30G/Twist misc        econazole nitrate (SPECTAZOLE) 1 % cream Apply topically to the affected and surrounding areas of skin as directed 2 (Two) Times a Day. 85 g 1    eplerenone (INSPRA) 50 MG tablet Take 1 tablet by mouth Daily. 90 tablet 3    ezetimibe (ZETIA) 10 MG tablet Take 1 tablet by mouth Daily. 90 tablet 3    glimepiride (AMARYL) 4 MG tablet Take 1 tablet by mouth 2 (two) times daily with meals. 90 tablet 1    HYDROcodone-acetaminophen (NORCO) 5-325 MG per tablet Take 1 tablet by mouth Every 6 (Six) Hours As Needed for Moderate Pain for up to 10 doses. 10 tablet 0    hydrocortisone 2.5 % ointment Apply a thin layer to affected areas by topical route twice daily. Safe to use on face. 454 g 11    icosapent ethyl (Vascepa) 1 g capsule capsule Take 2 capsules by mouth 2 (Two) Times a Day With Meals. 180 capsule 1    Insulin Degludec FlexTouch 200 UNIT/ML solution pen-injector Inject 54 Units under the skin into the appropriate area as directed Daily. 16.2 mL 3    Insulin Pen Needle (Pen Needles) 31G X 5 MM misc Use 1 to Inject under the skin into the appropriate area as directed 4 (Four) Times a Day. 200 each 5    ketotifen ([employer] Allergy Eye Drops) 0.025 % ophthalmic solution Location: Both Eyes. Instill one drop in each affected eye as needed. 10 mL 11    levothyroxine (SYNTHROID, LEVOTHROID) 50 MCG tablet Take 1 tablet by mouth Daily. 90 tablet 1    lidocaine (LIDODERM) 5 % Place 1 patch on the skin as directed by provider Daily. Remove & Discard patch within 12 hours or as directed by MD 10 patch 0    metFORMIN ER (GLUCOPHAGE-XR) 500 MG 24 hr tablet Take 2 tablets by mouth 2 (Two) Times a Day. 360 tablet 1    metoprolol succinate XL (Toprol XL) 50 MG 24 hr tablet Take 1 tablet by mouth 2 (Two) Times a Day. 180 tablet 1    mometasone (ELOCON) 0.1 % ointment Apply sparingly two times per day to the affected areas on the ears and the Umbilicus and patch like areas on the trunk and extremities 45 g 0    multivitamin (THERAGRAN)  tablet tablet Take 1 tablet by mouth Daily.      mupirocin (BACTROBAN) 2 % ointment Apply to affected area twice daily to open wounds. Use with hydrocortisone 22 g 2    olmesartan (Benicar) 40 MG tablet Take 1 tablet by mouth Daily. 90 tablet 1    pimecrolimus (ELIDEL) 1 % cream Use twice daily to areas of flare. 60 g 5    rosuvastatin (CRESTOR) 40 MG tablet Take 1 tablet by mouth Every Night. 90 tablet 3    tacrolimus (PROTOPIC) 0.1 % ointment Apply twice daily to affected area 100 g 11    terazosin (HYTRIN) 5 MG capsule Take 1 capsule by mouth Every Night.      thiamine (VITAMIN B1) 100 MG tablet Take 1 tablet by mouth.      torsemide (DEMADEX) 10 MG tablet Take 1 tablet by mouth Daily. 90 tablet 3    dapagliflozin Propanediol 10 MG tablet Take 10 mg by mouth Daily.      Insulin Degludec FlexTouch 200 UNIT/ML solution pen-injector Inject 38 Units under the skin into the appropriate area as directed Daily. 15 mL 5    Insulin Degludec FlexTouch 200 UNIT/ML solution pen-injector Inject 48 Units under the skin into the appropriate area as directed Daily. 15 mL 5    Accu-Chek Guide test strip  (Patient not taking: Reported on 7/2/2025)      Cinnamon 500 MG capsule Take 2 capsules by mouth. (Patient not taking: Reported on 7/2/2025)      dapagliflozin Propanediol 10 MG tablet Take 1 tablet by mouth Daily. 90 tablet 1    naloxone (NARCAN) 4 MG/0.1ML nasal spray Call 911. Don't prime. [place] in 1 nostril for overdose. Repeat in 2-3 minutes in other nostril if no or minimal breathing/responsiveness. (Patient not taking: Reported on 7/2/2025) 2 each 0    dapagliflozin Propanediol 10 MG tablet Take 1 tablet by mouth daily. 90 tablet 1    glimepiride (AMARYL) 4 MG tablet Take 1 tablet by mouth 2 (Two) Times a Day With Meals. 90 tablet 1    Insulin Pen Needle 32G X 4 MM misc Use 1 each up to 4 (four) times daily. 200 each 5    metFORMIN ER (GLUCOPHAGE-XR) 500 MG 24 hr tablet Take 2 tablets by mouth 2 (Two) Times a Day. 360  "tablet 1     No facility-administered medications prior to visit.     Opioid medication/s are on active medication list.  and I have evaluated his active treatment plan and pain score trends (see table).  Vitals:    07/02/25 0908   PainSc: 0-No pain     I have reviewed the chart for potential of high risk medication and harmful drug interactions in the elderly.        Aspirin is on active medication list. Aspirin use is indicated based on review of current medical condition/s. Pros and cons of this therapy have been discussed today. Benefits of this medication outweigh potential harm.  Patient has been encouraged to continue taking this medication.  .      Patient Active Problem List   Diagnosis    Age-related cataract of both eyes    Aortic stenosis, mild    Arthritis    CAD (coronary artery disease)    HTN (hypertension)    Hyperkalemia    Hyperlipidemia    Knee pain    Left carotid bruit    Loose body in elbow joint, right    Non-ST elevation MI (NSTEMI)    Obesity    Obstructive sleep apnea    Osteoarthritis of both knees    Proteinuria    S/P PTCA (percutaneous transluminal coronary angioplasty)    Smoking hx    Stage 3 chronic kidney disease    Tear of medial meniscus of knee    Type 2 diabetes mellitus with diabetic chronic kidney disease    Uncontrolled type 2 diabetes mellitus with hyperglycemia    Vitamin D deficiency    Gout    Abnormal TSH    Subclinical hypothyroidism    Hypothyroidism    Hypertriglyceridemia     Advance Care Planning Advance Directive is not on file.  ACP discussion was held with the patient during this visit. Patient does not have an advance directive, declines further assistance.            Objective   Vitals:    07/02/25 0908   BP: 110/60   BP Location: Right arm   Patient Position: Sitting   Cuff Size: Large Adult   Pulse: 70   Resp: 16   Temp: 98.2 °F (36.8 °C)   TempSrc: Oral   SpO2: 95%   Weight: 127 kg (279 lb 6.4 oz)   Height: 188 cm (74.02\")   PainSc: 0-No pain " "      Estimated body mass index is 35.86 kg/m² as calculated from the following:    Height as of this encounter: 188 cm (74.02\").    Weight as of this encounter: 127 kg (279 lb 6.4 oz).    Class 2 Severe Obesity (BMI >=35 and <=39.9). Obesity-related health conditions include the following: obstructive sleep apnea, hypertension, coronary heart disease, diabetes mellitus, dyslipidemias, and osteoarthritis. Obesity is unchanged. BMI is is above average; BMI management plan is completed. We discussed portion control and increasing exercise.           Does the patient have evidence of cognitive impairment? No  Lab Results   Component Value Date    CHLPL 111 2025    TRIG 200 (H) 2025    HDL 47 2025    LDL 32 2025    VLDL 32 2025    HGBA1C 9.0 (H) 2025                                                                                                Health  Risk Assessment    Smoking Status:  Social History     Tobacco Use   Smoking Status Former    Current packs/day: 0.00    Average packs/day: 1.5 packs/day for 41.0 years (61.5 ttl pk-yrs)    Types: Cigarettes    Start date: 1971    Quit date: 2012    Years since quittin.5    Passive exposure: Past   Smokeless Tobacco Never   Tobacco Comments    Quit in ; 1+PPD     Alcohol Consumption:  Social History     Substance and Sexual Activity   Alcohol Use Not Currently    Comment: rarely       Fall Risk Screen  STEADI Fall Risk Assessment was completed, and patient is at LOW risk for falls.Assessment completed on:2025    Depression Screening   Little interest or pleasure in doing things? Not at all   Feeling down, depressed, or hopeless? Not at all   PHQ-2 Total Score 0      Health Habits and Functional and Cognitive Screenin/25/2025     8:37 AM   Functional & Cognitive Status   Do you have difficulty preparing food and eating? No   Do you have difficulty bathing yourself, getting dressed or grooming yourself? No "   Do you have difficulty using the toilet? No   Do you have difficulty moving around from place to place? No   Do you have trouble with steps or getting out of a bed or a chair? No   Current Diet Well Balanced Diet   Dental Exam Up to date   Eye Exam Up to date   Exercise (times per week) 3 times per week   Current Exercises Include No Regular Exercise   Do you need help using the phone?  No   Are you deaf or do you have serious difficulty hearing?  No   Do you need help to go to places out of walking distance? No   Do you need help shopping? No   Do you need help preparing meals?  No   Do you need help with housework?  No   Do you need help with laundry? No   Do you need help taking your medications? No   Do you need help managing money? No   Do you ever drive or ride in a car without wearing a seat belt? Yes   Have you felt unusual fatigue (could be tiredness), stress, anger or loneliness in the last month? No   Who do you live with? Spouse   If you need help, do you have trouble finding someone available to you? No   Have you been bothered in the last four weeks by sexual problems? No   Do you have difficulty concentrating, remembering or making decisions? No           Age-appropriate Screening Schedule:  Refer to the list below for future screening recommendations based on patient's age, sex and/or medical conditions. Orders for these recommended tests are listed in the plan section. The patient has been provided with a written plan.    Health Maintenance List  Health Maintenance   Topic Date Due    ZOSTER VACCINE (2 of 3) 11/15/2017    COLORECTAL CANCER SCREENING  04/09/2025    COVID-19 Vaccine (4 - 2024-25 season) 09/08/2025 (Originally 9/1/2024)    INFLUENZA VACCINE  09/08/2025 (Originally 7/1/2025)    DIABETIC EYE EXAM  11/20/2025    HEMOGLOBIN A1C  12/23/2025    LUNG CANCER SCREENING  01/21/2026    DIABETIC FOOT EXAM  04/23/2026    URINE MICROALBUMIN-CREATININE RATIO (uACR)  05/14/2026    LIPID PANEL   "06/23/2026    ANNUAL WELLNESS VISIT  07/02/2026    Pneumococcal Vaccine 50+ (3 of 3 - PCV20 or PCV21) 01/28/2027    TDAP/TD VACCINES (2 - Td or Tdap) 09/20/2029    HEPATITIS C SCREENING  Completed    AAA SCREEN ONCE  Completed                                                                                                                                                CMS Preventative Services Quick Reference  Risk Factors Identified During Encounter  Immunizations Discussed/Encouraged: Shingrix  Dental Screening Recommended  Vision Screening Recommended    The above risks/problems have been discussed with the patient.  Pertinent information has been shared with the patient in the After Visit Summary.  An After Visit Summary and PPPS were made available to the patient.    Follow Up:   Next Medicare Wellness visit to be scheduled in 1 year.         Additional E&M Note during same encounter follows:  Patient has additional, significant, and separately identifiable condition(s)/problem(s) that require work above and beyond the Medicare Wellness Visit     Chief Complaint  Medicare Wellness-subsequent    Subjective   HPI  Amrik is also being seen today for additional medical problem/s.    Review of Systems   Neurological:  Positive for dizziness (has improved, doing home exercises that has helped,).              Objective   Vital Signs:  /60 (BP Location: Right arm, Patient Position: Sitting, Cuff Size: Large Adult)   Pulse 70   Temp 98.2 °F (36.8 °C) (Oral)   Resp 16   Ht 188 cm (74.02\")   Wt 127 kg (279 lb 6.4 oz)   SpO2 95%   BMI 35.86 kg/m²   Physical Exam  Vitals reviewed.   HENT:      Head: Normocephalic.      Right Ear: Tympanic membrane normal.      Left Ear: Tympanic membrane normal.   Eyes:      Pupils: Pupils are equal, round, and reactive to light.   Neck:      Vascular: No carotid bruit.   Cardiovascular:      Rate and Rhythm: Normal rate and regular rhythm.      Pulses: Normal pulses. "   Pulmonary:      Effort: Pulmonary effort is normal.      Breath sounds: Normal breath sounds.   Musculoskeletal:         General: Normal range of motion.      Right lower leg: No edema.      Left lower leg: No edema.   Skin:     General: Skin is warm and dry.   Neurological:      Mental Status: He is alert and oriented to person, place, and time.   Psychiatric:         Behavior: Behavior normal.         The following data was reviewed by: KAI Brown on 07/02/2025:  Data reviewed : labs         Assessment and Plan      Medicare annual wellness visit, subsequent         Primary hypertension  Hypertension is stable and controlled  Continue current treatment regimen.  Blood pressure will be reassessed in 6 months.    Orders:    CBC & Differential; Future    Hypertriglyceridemia   Lipid abnormalities are stable    Plan:  Continue same medication/s without change.      Discussed medication dosage, use, side effects, and goals of treatment in detail.    Counseled patient on lifestyle modifications to help control hyperlipidemia.     Patient Treatment Goals:   LDL goal is less than 55    Followup in 6 months.    Orders:    Lipid Panel With LDL / HDL Ratio; Future    Mixed hyperlipidemia   Lipid abnormalities are stable    Plan:  Continue same medication/s without change.      Discussed medication dosage, use, side effects, and goals of treatment in detail.    Counseled patient on lifestyle modifications to help control hyperlipidemia.     Patient Treatment Goals:   LDL goal is less than 55    Followup in 6 months.    Orders:    Lipid Panel With LDL / HDL Ratio; Future    Uncontrolled type 2 diabetes mellitus with hyperglycemia  Diabetes is uncontrolled.   follow up with endocrinology KAI Constantino  Diabetes will be reassessed by endocrinology    Orders:    Comprehensive Metabolic Panel; Future    Hemoglobin A1c; Future    Microalbumin / Creatinine Urine Ratio - Urine, Clean Catch;  Future    Obstructive sleep apnea  Continue to use CPAP nightly, follow up with Dr. Nguyen       Screen for colon cancer    Orders:    Cologuard - Stool, Per Rectum; Future            Follow Up   No follow-ups on file.  Patient was given instructions and counseling regarding his condition or for health maintenance advice. Please see specific information pulled into the AVS if appropriate.

## 2025-07-02 NOTE — ASSESSMENT & PLAN NOTE
Diabetes is uncontrolled.   follow up with endocrinology KAI Constantino  Diabetes will be reassessed by endocrinology    Orders:    Comprehensive Metabolic Panel; Future    Hemoglobin A1c; Future    Microalbumin / Creatinine Urine Ratio - Urine, Clean Catch; Future

## 2025-07-02 NOTE — ASSESSMENT & PLAN NOTE
Lipid abnormalities are stable    Plan:  Continue same medication/s without change.      Discussed medication dosage, use, side effects, and goals of treatment in detail.    Counseled patient on lifestyle modifications to help control hyperlipidemia.     Patient Treatment Goals:   LDL goal is less than 55    Followup in 6 months.    Orders:    Lipid Panel With LDL / HDL Ratio; Future

## 2025-07-03 RX ORDER — METOPROLOL SUCCINATE 50 MG/1
50 TABLET, EXTENDED RELEASE ORAL 2 TIMES DAILY
Qty: 180 TABLET | Refills: 1 | Status: SHIPPED | OUTPATIENT
Start: 2025-07-03

## 2025-07-03 NOTE — TELEPHONE ENCOUNTER
Rx Refill Note  Requested Prescriptions     Pending Prescriptions Disp Refills    metoprolol succinate XL (Toprol XL) 50 MG 24 hr tablet 180 tablet 1     Sig: Take 1 tablet by mouth 2 (Two) Times a Day.      Last office visit with prescribing clinician: 7/2/2025   Last telemedicine visit with prescribing clinician: Visit date not found   Next office visit with prescribing clinician: 1/19/2026                         Would you like a call back once the refill request has been completed: [] Yes [] No    If the office needs to give you a call back, can they leave a voicemail: [] Yes [] No    Jonathan Lo MA  07/03/25, 08:50 EDT

## 2025-07-15 ENCOUNTER — OFFICE VISIT (OUTPATIENT)
Age: 70
End: 2025-07-15
Payer: COMMERCIAL

## 2025-07-15 VITALS
HEIGHT: 74 IN | WEIGHT: 276.5 LBS | BODY MASS INDEX: 35.49 KG/M2 | SYSTOLIC BLOOD PRESSURE: 124 MMHG | HEART RATE: 77 BPM | DIASTOLIC BLOOD PRESSURE: 64 MMHG

## 2025-07-15 DIAGNOSIS — I25.10 CORONARY ARTERY DISEASE INVOLVING NATIVE CORONARY ARTERY OF NATIVE HEART WITHOUT ANGINA PECTORIS: Primary | ICD-10-CM

## 2025-07-15 PROCEDURE — 93000 ELECTROCARDIOGRAM COMPLETE: CPT | Performed by: INTERNAL MEDICINE

## 2025-07-15 PROCEDURE — 99214 OFFICE O/P EST MOD 30 MIN: CPT | Performed by: INTERNAL MEDICINE

## 2025-07-15 NOTE — PROGRESS NOTES
Subjective:     Encounter Date:07/15/25        Patient ID: Amrik Trimble III is a 69 y.o. male.    Chief Complaint:  CAD AS  HPI:   69 year old man with a known history of CAD status post 2 vessel PCI in 2012. He also has known mild AS based on echo in 2022, performed at [place].   He is doing well.  He has no angina or dyspnea.  He needs hip surgery however due to his uncontrolled diabetes this has not been scheduled.  We were planning on doing a preoperative nuclear stress test last year and as a result this was not completed.  He is unable to exercise due to hip pain.  His LDL is at goal.  A1c remains elevated at 9%.  His grandson has lymphoma, 11 years old, but is doing well.    Former smoker, quit at time of MI.     The following portions of the patient's history were reviewed and updated as appropriate: allergies, current medications, past family history, past medical history, past social history, past surgical history and problem list.     REVIEW OF SYSTEMS:   All systems reviewed.  Pertinent positives identified in HPI.  All other systems are negative.    Past Medical History:   Diagnosis Date    Age-related cataract of both eyes 12/27/2017    Ankle sprain     Aortic stenosis, mild 03/08/2021    Arthritis 07/08/2020    Formatting of this note might be different from the original. Added automatically from request for surgery [id number]    CAD (coronary artery disease) 05/10/2013    CTS (carpal tunnel syndrome)     Gout 04/24/2023    HTN (hypertension) 05/10/2013    Hyperkalemia 11/18/2021    Hyperlipidemia 05/10/2013    Knee sprain     Left carotid bruit 03/08/2021    Low back strain     Non-ST elevation MI (NSTEMI) 02/01/2012    Obesity 07/30/2014    Obstructive sleep apnea 04/24/2023    Formatting of this note might be different from the original. compliant    Osteoarthritis of both knees 02/10/2022    Periarthritis of shoulder     Proteinuria 02/15/2016    S/P PTCA (percutaneous transluminal coronary  angioplasty) 2014    Smoking hx 05/10/2013    Stage 3 chronic kidney disease 2021    Tear of medial meniscus of knee 2015    Formatting of this note might be different from the original. Added automatically from request for surgery [id number]    Tear of meniscus of knee     Type 2 diabetes mellitus with diabetic chronic kidney disease 2021    Uncontrolled type 2 diabetes mellitus with hyperglycemia 2015    Vitamin D deficiency 2014    Wrist sprain        Family History   Problem Relation Age of Onset    Emphysema Mother     COPD Mother     No Known Problems Father     No Known Problems Sister     No Known Problems Brother     Diabetes Maternal Grandmother     Arthritis Maternal Grandfather     Kidney disease Maternal Grandfather     Glaucoma Paternal Grandmother     No Known Problems Paternal Grandfather        Social History     Socioeconomic History    Marital status:    Tobacco Use    Smoking status: Former     Current packs/day: 0.00     Average packs/day: 1.5 packs/day for 41.0 years (61.5 ttl pk-yrs)     Types: Cigarettes     Start date: 1971     Quit date: 2012     Years since quittin.5     Passive exposure: Past    Smokeless tobacco: Never    Tobacco comments:     Quit in ; 1+PPD   Vaping Use    Vaping status: Never Used   Substance and Sexual Activity    Alcohol use: Not Currently     Comment: rarely    Drug use: Never    Sexual activity: Yes     Partners: Female     Birth control/protection: Pill       No Known Allergies    Past Surgical History:   Procedure Laterality Date    CARDIAC CATHETERIZATION      CORONARY ANGIOPLASTY      with stents    CORONARY STENT PLACEMENT      ELBOW ARTHROPLASTY Right     ELBOW PROCEDURE      HAND SURGERY Left     INGUINAL HERNIA REPAIR Bilateral     KIDNEY STONE SURGERY      KNEE SURGERY      MENISCECTOMY Bilateral     ROTATOR CUFF REPAIR Left     ROTATOR CUFF REPAIR Right     x 2    SHOULDER SURGERY      THUMB  ARTHROSCOPY Left     re-attachement    WRIST SURGERY           ECG 12 Lead    Date/Time: 7/15/2025 10:45 AM  Performed by: Mehreen Lee MD    Authorized by: Mehreen Lee MD  Comparison: compared with previous ECG from 7/8/2024  Rhythm: sinus rhythm  Rate: normal  Conduction: conduction normal  ST Segments: ST segments normal  T Waves: T waves normal  QRS axis: normal  Other: no other findings    Clinical impression: normal ECG             Objective:         PHYSICAL EXAM:  GEN: VSS, no distress,   Eyes: normal sclera, normal lids and lashes  HENT: moist mucus membranes,   Respiratory: CTAB, no rales or wheezes  CV: RRR, 2/6 systolic murmur radiates to carotids and apex , +2 DP and 2+ carotid pulses b/l  GI: NABS, soft,  Nontender, nondistended  MSK: no edema, no scoliosis or kyphosis  Skin: no rash, warm, dry  Heme/Lymph: no bruising or bleeding  Psych: organized thought, normal behavior and affect  Neuro: Cranial nerves grossly intact, Alert and Oriented x 3.         Assessment:          Diagnosis Plan   1. Coronary artery disease involving native coronary artery of native heart without angina pectoris               Plan:       CAD: remote 2 vessel PCI 2012. No angina. ASA.  Will need preoperative stress test prior to hip surgery given uncontrolled diabetes and inability to exercise  HTN: Controlled  Mild AS: Murmur is stable  CKD  DM poorly controlled    Rupinder, thank you very much for referring this kind patient to me. Please call me with any questions or concerns. I will see the patient again in the office in 1 year.          Mehreen Lee MD  07/15/25  [facility]    Outpatient Encounter Medications as of 7/15/2025   Medication Sig Dispense Refill    Accu-Chek Guide test strip       acetaminophen (TYLENOL) 500 MG tablet Take 2 tablets by mouth Every 8 (Eight) Hours As Needed for Mild Pain. 60 tablet 0    allopurinol (ZYLOPRIM) 100 MG tablet Take 1 tablet by mouth Daily. 90 tablet 3     amantadine (SYMMETREL) 100 MG tablet       aspirin 81 MG EC tablet Take 1 tablet by mouth.      Cholecalciferol 50 MCG (2000 UT) capsule Take 1 capsule by mouth Daily.      cloNIDine (CATAPRES) 0.1 MG tablet Take 1 tablet by mouth Every Morning AND 1 tablet Every Evening AND 1 tablet every night at bedtime. 270 tablet 3    clotrimazole-betamethasone (LOTRISONE) 1-0.05 % cream Apply a thin layer to affected ears once daily for 5 days in a row, then for reoccurrence, use once daily for 1-2 days 45 g 1    Coenzyme Q10 200 MG capsule Take 1 tablet by mouth Daily.      cyclobenzaprine (FLEXERIL) 10 MG tablet Take 1 tablet by mouth 3 (Three) Times a Day As Needed for Muscle Spasms. 30 tablet 1    dapagliflozin Propanediol 10 MG tablet Take 1 tablet by mouth Daily. 90 tablet 1    desonide (DESOWEN) 0.05 % ointment Apply sparingly to the affected area on the scaly areas on the periorbital area and some on the eyebrow lid area as directed 2 (Two) Times a Day. 60 g 1    Easy Touch Lancets 30G/Twist misc       econazole nitrate (SPECTAZOLE) 1 % cream Apply topically to the affected and surrounding areas of skin as directed 2 (Two) Times a Day. 85 g 1    eplerenone (INSPRA) 50 MG tablet Take 1 tablet by mouth Daily. 90 tablet 3    ezetimibe (ZETIA) 10 MG tablet Take 1 tablet by mouth Daily. 90 tablet 3    glimepiride (AMARYL) 4 MG tablet Take 1 tablet by mouth 2 (two) times daily with meals. 90 tablet 1    HYDROcodone-acetaminophen (NORCO) 5-325 MG per tablet Take 1 tablet by mouth Every 6 (Six) Hours As Needed for Moderate Pain for up to 10 doses. 10 tablet 0    hydrocortisone 2.5 % ointment Apply a thin layer to affected areas by topical route twice daily. Safe to use on face. 454 g 11    icosapent ethyl (Vascepa) 1 g capsule capsule Take 2 capsules by mouth 2 (Two) Times a Day With Meals. 180 capsule 1    Insulin Degludec FlexTouch 200 UNIT/ML solution pen-injector Inject 54 Units under the skin into the appropriate area as  directed Daily. 16.2 mL 3    Insulin Pen Needle (Pen Needles) 31G X 5 MM misc Use 1 to Inject under the skin into the appropriate area as directed 4 (Four) Times a Day. 200 each 5    ketotifen ([employer] Allergy Eye Drops) 0.025 % ophthalmic solution Location: Both Eyes. Instill one drop in each affected eye as needed. 10 mL 11    levothyroxine (SYNTHROID, LEVOTHROID) 50 MCG tablet Take 1 tablet by mouth Daily. 90 tablet 1    lidocaine (LIDODERM) 5 % Place 1 patch on the skin as directed by provider Daily. Remove & Discard patch within 12 hours or as directed by MD 10 patch 0    metFORMIN ER (GLUCOPHAGE-XR) 500 MG 24 hr tablet Take 2 tablets by mouth 2 (Two) Times a Day. 360 tablet 1    metoprolol succinate XL (Toprol XL) 50 MG 24 hr tablet Take 1 tablet by mouth 2 (Two) Times a Day. 180 tablet 1    mometasone (ELOCON) 0.1 % ointment Apply sparingly two times per day to the affected areas on the ears and the Umbilicus and patch like areas on the trunk and extremities 45 g 0    multivitamin (THERAGRAN) tablet tablet Take 1 tablet by mouth Daily.      mupirocin (BACTROBAN) 2 % ointment Apply to affected area twice daily to open wounds. Use with hydrocortisone 22 g 2    naloxone (NARCAN) 4 MG/0.1ML nasal spray Call 911. Don't prime. [place] in 1 nostril for overdose. Repeat in 2-3 minutes in other nostril if no or minimal breathing/responsiveness. 2 each 0    olmesartan (Benicar) 40 MG tablet Take 1 tablet by mouth Daily. 90 tablet 1    pimecrolimus (ELIDEL) 1 % cream Use twice daily to areas of flare. 60 g 5    rosuvastatin (CRESTOR) 40 MG tablet Take 1 tablet by mouth Every Night. 90 tablet 3    tacrolimus (PROTOPIC) 0.1 % ointment Apply twice daily to affected area 100 g 11    terazosin (HYTRIN) 5 MG capsule Take 1 capsule by mouth Every Night.      thiamine (VITAMIN B1) 100 MG tablet Take 1 tablet by mouth.      torsemide (DEMADEX) 10 MG tablet Take 1 tablet by mouth Daily. 90 tablet 3    Cinnamon 500 MG capsule Take 2  capsules by mouth. (Patient not taking: Reported on 7/15/2025)       No facility-administered encounter medications on file as of 7/15/2025.

## 2025-07-31 ENCOUNTER — OFFICE VISIT (OUTPATIENT)
Dept: SURGERY | Facility: CLINIC | Age: 70
End: 2025-07-31
Payer: COMMERCIAL

## 2025-07-31 VITALS
DIASTOLIC BLOOD PRESSURE: 86 MMHG | BODY MASS INDEX: 36.29 KG/M2 | OXYGEN SATURATION: 98 % | WEIGHT: 282.8 LBS | SYSTOLIC BLOOD PRESSURE: 140 MMHG | HEART RATE: 62 BPM | HEIGHT: 74 IN

## 2025-07-31 DIAGNOSIS — R19.5 POSITIVE COLORECTAL CANCER SCREENING USING COLOGUARD TEST: Primary | ICD-10-CM

## 2025-07-31 NOTE — PROGRESS NOTES
Colorectal & General Surgery  Consultation    Patient: Amrik Trimble III  YOB: 1955  MRN: [id number]      Assessment & Plan  Amrik Trimble III is a 69 y.o. male with positive Cologuard test with no other high risk features or concerning symptoms.  We discussed proceeding the endoscopy suite for colonoscopy, including risk, benefits, alternatives to procedure.  Informed consent was obtained    Referring Provider: Wyatt Miguel MD     Reason for Consultation: Positive Cologuard    History of Present Illness   Armik Trimble III is a 69 y.o. male who presents to the office with a positive Cologuard test.  He denies hematochezia, melena, tenesmus, change in bowel habits, unintentional weight loss.  He has no personal or family history of colorectal cancer.  He had a colonoscopy many years ago.    Most recent colonoscopy: Many years ago    Past Medical History   Past Medical History:   Diagnosis Date    Age-related cataract of both eyes 12/27/2017    Ankle sprain     Aortic stenosis, mild 03/08/2021    Arthritis 07/08/2020    Formatting of this note might be different from the original. Added automatically from request for surgery [id number]    CAD (coronary artery disease) 05/10/2013    CTS (carpal tunnel syndrome)     Gout 04/24/2023    HTN (hypertension) 05/10/2013    Hyperkalemia 11/18/2021    Hyperlipidemia 05/10/2013    Knee sprain     Left carotid bruit 03/08/2021    Low back strain     Non-ST elevation MI (NSTEMI) 02/01/2012    Obesity 07/30/2014    Obstructive sleep apnea 04/24/2023    Formatting of this note might be different from the original. compliant    Osteoarthritis of both knees 02/10/2022    Periarthritis of shoulder     Proteinuria 02/15/2016    Pulmonary arterial hypertension 2001    Renal insufficiency     S/P PTCA (percutaneous transluminal coronary angioplasty) 07/30/2014    Smoking hx 05/10/2013    Stage 3 chronic kidney disease 11/18/2021    Tear of medial meniscus of  knee 2015    Formatting of this note might be different from the original. Added automatically from request for surgery [id number]    Tear of meniscus of knee     Type 2 diabetes mellitus with diabetic chronic kidney disease 2021    Uncontrolled type 2 diabetes mellitus with hyperglycemia 2015    Vitamin D deficiency 2014    Wrist sprain         Past Surgical History   Past Surgical History:   Procedure Laterality Date    CARDIAC CATHETERIZATION      CORONARY ANGIOPLASTY      with stents    CORONARY STENT PLACEMENT  2012    ELBOW ARTHROPLASTY Right     ELBOW PROCEDURE      HAND SURGERY Left     INGUINAL HERNIA REPAIR Bilateral     KIDNEY STONE SURGERY      KNEE SURGERY      MENISCECTOMY Bilateral     ROTATOR CUFF REPAIR Left     ROTATOR CUFF REPAIR Right     x 2    SHOULDER SURGERY      THUMB ARTHROSCOPY Left     re-attachement    WRIST SURGERY         Social History  Social History     Socioeconomic History    Marital status:    Tobacco Use    Smoking status: Former     Current packs/day: 0.00     Average packs/day: 1.5 packs/day for 41.0 years (61.5 ttl pk-yrs)     Types: Cigarettes     Start date: 1971     Quit date: 2012     Years since quittin.5     Passive exposure: Past    Smokeless tobacco: Never    Tobacco comments:     Quit in ; 1+PPD   Vaping Use    Vaping status: Never Used   Substance and Sexual Activity    Alcohol use: Not Currently     Comment: rarely    Drug use: Never    Sexual activity: Yes     Partners: Female     Birth control/protection: Pill       Family History  Family History   Problem Relation Age of Onset    Emphysema Mother     COPD Mother     No Known Problems Father     No Known Problems Sister     No Known Problems Brother     Diabetes Maternal Grandmother     Arthritis Maternal Grandfather     Kidney disease Maternal Grandfather     Glaucoma Paternal Grandmother     No Known Problems Paternal Grandfather        Colorectal cancer family  history: None    Review of Systems  Negative except as documented in the HPI.     Allergies  No Known Allergies    Medications    Current Outpatient Medications:     Accu-Chek Guide test strip, , Disp: , Rfl:     acetaminophen (TYLENOL) 500 MG tablet, Take 2 tablets by mouth Every 8 (Eight) Hours As Needed for Mild Pain., Disp: 60 tablet, Rfl: 0    allopurinol (ZYLOPRIM) 100 MG tablet, Take 1 tablet by mouth Daily., Disp: 90 tablet, Rfl: 3    amantadine (SYMMETREL) 100 MG tablet, , Disp: , Rfl:     aspirin 81 MG EC tablet, Take 1 tablet by mouth., Disp: , Rfl:     Cholecalciferol 50 MCG (2000 UT) capsule, Take 1 capsule by mouth Daily., Disp: , Rfl:     Cinnamon 500 MG capsule, Take 2 capsules by mouth., Disp: , Rfl:     cloNIDine (CATAPRES) 0.1 MG tablet, Take 1 tablet by mouth Every Morning AND 1 tablet Every Evening AND 1 tablet every night at bedtime., Disp: 270 tablet, Rfl: 3    clotrimazole-betamethasone (LOTRISONE) 1-0.05 % cream, Apply a thin layer to affected ears once daily for 5 days in a row, then for reoccurrence, use once daily for 1-2 days, Disp: 45 g, Rfl: 1    clotrimazole-betamethasone (LOTRISONE) 1-0.05 % cream, Apply a thin layer topically to the appropriate area of ears as directed Daily for 5 days in a row, THEN for reoccurence, use Daily for 1-2 days, Disp: 45 g, Rfl: 3    Coenzyme Q10 200 MG capsule, Take 1 tablet by mouth Daily., Disp: , Rfl:     cyclobenzaprine (FLEXERIL) 10 MG tablet, Take 1 tablet by mouth 3 (Three) Times a Day As Needed for Muscle Spasms., Disp: 30 tablet, Rfl: 1    dapagliflozin Propanediol 10 MG tablet, Take 1 tablet by mouth Daily., Disp: 90 tablet, Rfl: 1    dapagliflozin Propanediol 10 MG tablet, Take 1 tablet by mouth Daily., Disp: 90 tablet, Rfl: 1    desonide (DESOWEN) 0.05 % ointment, Apply sparingly to the affected area on the scaly areas on the periorbital area and some on the eyebrow lid area as directed 2 (Two) Times a Day., Disp: 60 g, Rfl: 1    Easy Touch  Lancets 30G/Twist misc, , Disp: , Rfl:     econazole nitrate (SPECTAZOLE) 1 % cream, Apply topically to the affected and surrounding areas of skin as directed 2 (Two) Times a Day., Disp: 85 g, Rfl: 1    eplerenone (INSPRA) 50 MG tablet, Take 1 tablet by mouth Daily., Disp: 90 tablet, Rfl: 3    ezetimibe (ZETIA) 10 MG tablet, Take 1 tablet by mouth Daily., Disp: 90 tablet, Rfl: 3    ezetimibe (ZETIA) 10 MG tablet, Take 1 tablet by mouth Daily., Disp: 90 tablet, Rfl: 3    glimepiride (AMARYL) 4 MG tablet, Take 1 tablet by mouth 2 (two) times daily with meals., Disp: 90 tablet, Rfl: 1    glimepiride (AMARYL) 4 MG tablet, Take 1 tablet by mouth 2 (Two) Times a Day With Meals., Disp: 90 tablet, Rfl: 1    HYDROcodone-acetaminophen (NORCO) 5-325 MG per tablet, Take 1 tablet by mouth Every 6 (Six) Hours As Needed for Moderate Pain for up to 10 doses., Disp: 10 tablet, Rfl: 0    hydrocortisone 2.5 % ointment, Apply a thin layer to affected areas by topical route twice daily. Safe to use on face., Disp: 454 g, Rfl: 11    icosapent ethyl (Vascepa) 1 g capsule capsule, Take 2 capsules by mouth 2 (Two) Times a Day With Meals., Disp: 180 capsule, Rfl: 1    Insulin Degludec FlexTouch 200 UNIT/ML solution pen-injector, Inject 54 Units under the skin into the appropriate area as directed Daily., Disp: 16.2 mL, Rfl: 3    Insulin Degludec FlexTouch 200 UNIT/ML solution pen-injector, Inject 58 Units under the skin into the appropriate area as directed Daily., Disp: 17.4 mL, Rfl: 3    Insulin Pen Needle (Pen Needles) 31G X 5 MM misc, Use 1 to Inject under the skin into the appropriate area as directed 4 (Four) Times a Day., Disp: 200 each, Rfl: 5    Insulin Pen Needle (Pen Needles) 31G X 5 MM misc, Use 1 pen needle to Inject into the skin 4 (four) times daily., Disp: 200 each, Rfl: 5    ketotifen ([employer] Allergy Eye Drops) 0.025 % ophthalmic solution, Location: Both Eyes. Instill one drop in each affected eye as needed., Disp: 10 mL, Rfl:  11    levothyroxine (SYNTHROID, LEVOTHROID) 50 MCG tablet, Take 1 tablet by mouth Daily., Disp: 90 tablet, Rfl: 1    levothyroxine (SYNTHROID, LEVOTHROID) 50 MCG tablet, Take 1 tablet by mouth Daily., Disp: 90 tablet, Rfl: 1    lidocaine (LIDODERM) 5 %, Place 1 patch on the skin as directed by provider Daily. Remove & Discard patch within 12 hours or as directed by MD, Disp: 10 patch, Rfl: 0    metFORMIN ER (GLUCOPHAGE-XR) 500 MG 24 hr tablet, Take 2 tablets by mouth 2 (Two) Times a Day., Disp: 360 tablet, Rfl: 1    metFORMIN ER (GLUCOPHAGE-XR) 500 MG 24 hr tablet, Take 2 tablets by mouth 2 (Two) Times a Day., Disp: 360 tablet, Rfl: 1    metoprolol succinate XL (Toprol XL) 50 MG 24 hr tablet, Take 1 tablet by mouth 2 (Two) Times a Day., Disp: 180 tablet, Rfl: 1    mometasone (ELOCON) 0.1 % ointment, Apply sparingly two times per day to the affected areas on the ears and the Umbilicus and patch like areas on the trunk and extremities, Disp: 45 g, Rfl: 0    multivitamin (THERAGRAN) tablet tablet, Take 1 tablet by mouth Daily., Disp: , Rfl:     mupirocin (BACTROBAN) 2 % ointment, Apply to affected area twice daily to open wounds. Use with hydrocortisone, Disp: 22 g, Rfl: 2    naloxone (NARCAN) 4 MG/0.1ML nasal spray, Call 911. Don't prime. [place] in 1 nostril for overdose. Repeat in 2-3 minutes in other nostril if no or minimal breathing/responsiveness., Disp: 2 each, Rfl: 0    NovoLOG FlexPen 100 UNIT/ML solution pen-injector sc pen, Inject 5 Units under the skin into the appropriate area as directed 3 (Three) Times a Day Before Meals., Disp: 13.5 mL, Rfl: 5    olmesartan (Benicar) 40 MG tablet, Take 1 tablet by mouth Daily., Disp: 90 tablet, Rfl: 1    pimecrolimus (ELIDEL) 1 % cream, Use twice daily to areas of flare., Disp: 60 g, Rfl: 5    rosuvastatin (CRESTOR) 40 MG tablet, Take 1 tablet by mouth Every Night., Disp: 90 tablet, Rfl: 3    rosuvastatin (CRESTOR) 40 MG tablet, Take 1 tablet by mouth Every Night., Disp:  90 tablet, Rfl: 3    tacrolimus (PROTOPIC) 0.1 % ointment, Apply twice daily to affected area, Disp: 100 g, Rfl: 11    terazosin (HYTRIN) 5 MG capsule, Take 1 capsule by mouth Every Night., Disp: , Rfl:     thiamine (VITAMIN B1) 100 MG tablet, Take 1 tablet by mouth., Disp: , Rfl:     torsemide (DEMADEX) 10 MG tablet, Take 1 tablet by mouth Daily., Disp: 90 tablet, Rfl: 3    Vital Signs  Vitals:    07/31/25 0810   BP: 140/86   Pulse: 62   SpO2: 98%        Physical Exam  Constitutional: Resting comfortably, no acute distress  Neck: Supple, trachea midline  Respiratory: No increased work of breathing, Symmetric excursion  Cardiovascular: Well pefursed, no jugular venous distention evident   Abdominal:  Soft, non-tender, non-distended  Lymphatics: No cervical or suprascapular adenopathy  Skin: Warm, dry, no rash on visualized skin surfaces  Musculoskeletal: Symmetric strength, no obvious gross abnormalities  Psychiatric: Alert and oriented ×3, normal affect     Laboratory Results  I have personally reviewed Cologuard test is positive.  CMP with creatinine 1.1.  CBC with WBC 7, hemoglobin 15, platelets 199.    Radiology  None to review    Endoscopy  None to review         Joel Landeros MD  Colorectal & General Surgery  [facility]    [address], [address]  [place], [place], [address]  P: [phone]  F: [phone]

## 2025-08-21 ENCOUNTER — TELEPHONE (OUTPATIENT)
Dept: SURGERY | Facility: CLINIC | Age: 70
End: 2025-08-21
Payer: COMMERCIAL

## 2025-08-25 ENCOUNTER — ANESTHESIA (OUTPATIENT)
Dept: GASTROENTEROLOGY | Facility: HOSPITAL | Age: 70
End: 2025-08-25
Payer: COMMERCIAL

## 2025-08-25 ENCOUNTER — ANESTHESIA EVENT (OUTPATIENT)
Dept: GASTROENTEROLOGY | Facility: HOSPITAL | Age: 70
End: 2025-08-25
Payer: COMMERCIAL

## 2025-08-25 ENCOUNTER — HOSPITAL ENCOUNTER (OUTPATIENT)
Facility: HOSPITAL | Age: 70
Setting detail: HOSPITAL OUTPATIENT SURGERY
Discharge: HOME | End: 2025-08-25
Attending: SURGERY | Admitting: SURGERY
Payer: COMMERCIAL

## 2025-08-25 VITALS
DIASTOLIC BLOOD PRESSURE: 76 MMHG | SYSTOLIC BLOOD PRESSURE: 138 MMHG | HEIGHT: 74 IN | RESPIRATION RATE: 16 BRPM | BODY MASS INDEX: 35.79 KG/M2 | WEIGHT: 278.9 LBS | HEART RATE: 61 BPM | OXYGEN SATURATION: 98 %

## 2025-08-25 DIAGNOSIS — R19.5 POSITIVE COLORECTAL CANCER SCREENING USING COLOGUARD TEST: ICD-10-CM

## 2025-08-25 LAB — GLUCOSE BLDC GLUCOMTR-MCNC: 141 MG/DL (ref 65–99)

## 2025-08-25 PROCEDURE — 25010000002 PROPOFOL 1000 MG/100ML EMULSION: Performed by: NURSE ANESTHETIST, CERTIFIED REGISTERED

## 2025-08-25 PROCEDURE — 25810000003 LACTATED RINGERS PER 1000 ML: Performed by: NURSE ANESTHETIST, CERTIFIED REGISTERED

## 2025-08-25 PROCEDURE — 25010000002 PROPOFOL 200 MG/20ML EMULSION: Performed by: NURSE ANESTHETIST, CERTIFIED REGISTERED

## 2025-08-25 PROCEDURE — 25010000002 LIDOCAINE 2% SOLUTION: Performed by: NURSE ANESTHETIST, CERTIFIED REGISTERED

## 2025-08-25 PROCEDURE — 88305 TISSUE EXAM BY PATHOLOGIST: CPT | Performed by: SURGERY

## 2025-08-25 PROCEDURE — 25810000003 LACTATED RINGERS PER 1000 ML: Performed by: SURGERY

## 2025-08-25 PROCEDURE — 82948 REAGENT STRIP/BLOOD GLUCOSE: CPT | Performed by: SURGERY

## 2025-08-25 DEVICE — WORKING LENGTH 235CM, WORKING CHANNEL 2.8MM
Type: IMPLANTABLE DEVICE | Site: COLON | Status: FUNCTIONAL
Brand: RESOLUTION 360 CLIP

## 2025-08-25 RX ORDER — PROPOFOL 10 MG/ML
INJECTION, EMULSION INTRAVENOUS AS NEEDED
Status: DISCONTINUED | OUTPATIENT
Start: 2025-08-25 | End: 2025-08-25 | Stop reason: SURG

## 2025-08-25 RX ORDER — SODIUM CHLORIDE, SODIUM LACTATE, POTASSIUM CHLORIDE, CALCIUM CHLORIDE 600; 310; 30; 20 MG/100ML; MG/100ML; MG/100ML; MG/100ML
INJECTION, SOLUTION INTRAVENOUS CONTINUOUS PRN
Status: DISCONTINUED | OUTPATIENT
Start: 2025-08-25 | End: 2025-08-25 | Stop reason: SURG

## 2025-08-25 RX ORDER — LIDOCAINE HYDROCHLORIDE 20 MG/ML
INJECTION, SOLUTION INFILTRATION; PERINEURAL AS NEEDED
Status: DISCONTINUED | OUTPATIENT
Start: 2025-08-25 | End: 2025-08-25 | Stop reason: SURG

## 2025-08-25 RX ORDER — SODIUM CHLORIDE, SODIUM LACTATE, POTASSIUM CHLORIDE, CALCIUM CHLORIDE 600; 310; 30; 20 MG/100ML; MG/100ML; MG/100ML; MG/100ML
30 INJECTION, SOLUTION INTRAVENOUS CONTINUOUS PRN
Status: DISCONTINUED | OUTPATIENT
Start: 2025-08-25 | End: 2025-08-25 | Stop reason: HOSPADM

## 2025-08-25 RX ORDER — PROPOFOL 10 MG/ML
INJECTION, EMULSION INTRAVENOUS CONTINUOUS PRN
Status: DISCONTINUED | OUTPATIENT
Start: 2025-08-25 | End: 2025-08-25 | Stop reason: SURG

## 2025-08-25 RX ADMIN — PROPOFOL INJECTABLE EMULSION 150 MCG/KG/MIN: 10 INJECTION, EMULSION INTRAVENOUS at 09:39

## 2025-08-25 RX ADMIN — LIDOCAINE HYDROCHLORIDE 50 MG: 20 INJECTION, SOLUTION INFILTRATION; PERINEURAL at 09:38

## 2025-08-25 RX ADMIN — SODIUM CHLORIDE, POTASSIUM CHLORIDE, SODIUM LACTATE AND CALCIUM CHLORIDE: 600; 310; 30; 20 INJECTION, SOLUTION INTRAVENOUS at 09:37

## 2025-08-25 RX ADMIN — PROPOFOL 200 MG: 10 INJECTION, EMULSION INTRAVENOUS at 09:38

## 2025-08-25 RX ADMIN — SODIUM CHLORIDE, POTASSIUM CHLORIDE, SODIUM LACTATE AND CALCIUM CHLORIDE 30 ML/HR: 600; 310; 30; 20 INJECTION, SOLUTION INTRAVENOUS at 08:52

## 2025-08-26 LAB
CYTO UR: NORMAL
LAB AP CASE REPORT: NORMAL
PATH REPORT.FINAL DX SPEC: NORMAL
PATH REPORT.GROSS SPEC: NORMAL

## (undated) DEVICE — TUBING, SUCTION, 1/4" X 10', STRAIGHT: Brand: MEDLINE

## (undated) DEVICE — Device

## (undated) DEVICE — ADAPT CLN BIOGUARD AIR/H2O DISP

## (undated) DEVICE — SNAR POLYP CAPTIVATOR2 RND STFF 2.4 25MM 240CM

## (undated) DEVICE — SENSR O2 OXIMAX FNGR A/ 18IN NONSTR

## (undated) DEVICE — PATIENT RETURN ELECTRODE, SINGLE-USE, CONTACT QUALITY MONITORING, ADULT, WITH 9FT CORD, FOR PATIENTS WEIGING OVER 33LBS. (15KG): Brand: MEGADYNE

## (undated) DEVICE — THE SINGLE USE ETRAP – POLYP TRAP IS USED FOR SUCTION RETRIEVAL OF ENDOSCOPICALLY REMOVED POLYPS.: Brand: ETRAP

## (undated) DEVICE — KT ORCA ORCAPOD DISP STRL

## (undated) DEVICE — NET RETRV ROTHNET SELCT 2.5MM 3X6X230CM NS

## (undated) DEVICE — SINGLE-USE BIOPSY FORCEPS: Brand: RADIAL JAW 4